# Patient Record
Sex: FEMALE | Race: WHITE | Employment: STUDENT | URBAN - NONMETROPOLITAN AREA
[De-identification: names, ages, dates, MRNs, and addresses within clinical notes are randomized per-mention and may not be internally consistent; named-entity substitution may affect disease eponyms.]

---

## 2020-01-30 ENCOUNTER — HOSPITAL ENCOUNTER (OUTPATIENT)
Dept: GENERAL RADIOLOGY | Age: 20
Discharge: HOME | End: 2020-01-30
Payer: COMMERCIAL

## 2020-01-30 ENCOUNTER — HOSPITAL ENCOUNTER (OUTPATIENT)
Age: 20
Discharge: HOME | End: 2020-01-30
Payer: COMMERCIAL

## 2020-01-30 ENCOUNTER — OFFICE VISIT (OUTPATIENT)
Dept: FAMILY MEDICINE CLINIC | Age: 20
End: 2020-01-30
Payer: COMMERCIAL

## 2020-01-30 VITALS
OXYGEN SATURATION: 97 % | BODY MASS INDEX: 21.71 KG/M2 | RESPIRATION RATE: 18 BRPM | DIASTOLIC BLOOD PRESSURE: 64 MMHG | HEIGHT: 62 IN | SYSTOLIC BLOOD PRESSURE: 100 MMHG | HEART RATE: 91 BPM | TEMPERATURE: 99.3 F | WEIGHT: 118 LBS

## 2020-01-30 DIAGNOSIS — R05.9 COUGH: ICD-10-CM

## 2020-01-30 DIAGNOSIS — R53.83 OTHER FATIGUE: ICD-10-CM

## 2020-01-30 PROBLEM — N94.3 PREMENSTRUAL SYNDROME: Status: ACTIVE | Noted: 2020-01-30

## 2020-01-30 PROBLEM — F41.9 ANXIETY: Status: ACTIVE | Noted: 2020-01-30

## 2020-01-30 PROBLEM — M54.6 ACUTE MIDLINE THORACIC BACK PAIN: Status: ACTIVE | Noted: 2020-01-30

## 2020-01-30 PROBLEM — J02.9 SORE THROAT: Status: ACTIVE | Noted: 2020-01-30

## 2020-01-30 LAB
BASOPHILS ABSOLUTE: 0.1 K/UL (ref 0–0.2)
BASOPHILS RELATIVE PERCENT: 0.7 %
EOSINOPHILS ABSOLUTE: 0.1 K/UL (ref 0–0.6)
EOSINOPHILS RELATIVE PERCENT: 0.8 %
HCT VFR BLD CALC: 36.2 % (ref 36–48)
HEMOGLOBIN: 11.6 G/DL (ref 12–16)
LYMPHOCYTES ABSOLUTE: 2 K/UL (ref 1–5.1)
LYMPHOCYTES RELATIVE PERCENT: 18.1 %
MCH RBC QN AUTO: 28 PG (ref 26–34)
MCHC RBC AUTO-ENTMCNC: 32.1 G/DL (ref 31–36)
MCV RBC AUTO: 87.1 FL (ref 80–100)
MONOCYTES ABSOLUTE: 1.2 K/UL (ref 0–1.3)
MONOCYTES RELATIVE PERCENT: 11 %
NEUTROPHILS ABSOLUTE: 7.8 K/UL (ref 1.7–7.7)
NEUTROPHILS RELATIVE PERCENT: 69.4 %
PDW BLD-RTO: 14.6 % (ref 12.4–15.4)
PLATELET # BLD: 381 K/UL (ref 135–450)
PMV BLD AUTO: 7.9 FL (ref 5–10.5)
RBC # BLD: 4.15 M/UL (ref 4–5.2)
STREPTOCOCCUS A RNA: NEGATIVE
WBC # BLD: 11.3 K/UL (ref 4–11)

## 2020-01-30 PROCEDURE — 71046 X-RAY EXAM CHEST 2 VIEWS: CPT

## 2020-01-30 PROCEDURE — G8484 FLU IMMUNIZE NO ADMIN: HCPCS | Performed by: PHYSICIAN ASSISTANT

## 2020-01-30 PROCEDURE — G8427 DOCREV CUR MEDS BY ELIG CLIN: HCPCS | Performed by: PHYSICIAN ASSISTANT

## 2020-01-30 PROCEDURE — 72072 X-RAY EXAM THORAC SPINE 3VWS: CPT

## 2020-01-30 PROCEDURE — 99203 OFFICE O/P NEW LOW 30 MIN: CPT | Performed by: PHYSICIAN ASSISTANT

## 2020-01-30 PROCEDURE — 1036F TOBACCO NON-USER: CPT | Performed by: PHYSICIAN ASSISTANT

## 2020-01-30 PROCEDURE — G8420 CALC BMI NORM PARAMETERS: HCPCS | Performed by: PHYSICIAN ASSISTANT

## 2020-01-30 PROCEDURE — 87651 STREP A DNA AMP PROBE: CPT | Performed by: PHYSICIAN ASSISTANT

## 2020-01-30 RX ORDER — ESCITALOPRAM OXALATE 20 MG/1
20 TABLET ORAL DAILY
Qty: 30 TABLET | Refills: 5 | Status: SHIPPED | OUTPATIENT
Start: 2020-01-30 | End: 2020-08-04

## 2020-01-30 RX ORDER — BUSPIRONE HYDROCHLORIDE 5 MG/1
5 TABLET ORAL 2 TIMES DAILY PRN
Qty: 60 TABLET | Refills: 5 | Status: SHIPPED | OUTPATIENT
Start: 2020-01-30 | End: 2020-08-06

## 2020-01-30 RX ORDER — BUSPIRONE HYDROCHLORIDE 5 MG/1
5 TABLET ORAL 2 TIMES DAILY PRN
COMMUNITY
End: 2020-01-30 | Stop reason: SDUPTHER

## 2020-01-30 RX ORDER — ONDANSETRON 4 MG/1
4 TABLET, ORALLY DISINTEGRATING ORAL PRN
COMMUNITY
Start: 2018-12-21 | End: 2020-01-30 | Stop reason: SDUPTHER

## 2020-01-30 RX ORDER — ESCITALOPRAM OXALATE 20 MG/1
20 TABLET ORAL DAILY
COMMUNITY
End: 2020-01-30 | Stop reason: SDUPTHER

## 2020-01-30 RX ORDER — BROMPHENIRAMINE MALEATE, PSEUDOEPHEDRINE HYDROCHLORIDE, AND DEXTROMETHORPHAN HYDROBROMIDE 2; 30; 10 MG/5ML; MG/5ML; MG/5ML
10 SYRUP ORAL
COMMUNITY
Start: 2020-01-28 | End: 2020-04-03 | Stop reason: ALTCHOICE

## 2020-01-30 RX ORDER — ONDANSETRON 4 MG/1
4 TABLET, ORALLY DISINTEGRATING ORAL EVERY 12 HOURS PRN
Qty: 60 TABLET | Refills: 3 | Status: SHIPPED | OUTPATIENT
Start: 2020-01-30 | End: 2020-12-21 | Stop reason: SDUPTHER

## 2020-01-30 RX ORDER — FAMOTIDINE 20 MG/1
20 TABLET, FILM COATED ORAL 2 TIMES DAILY
COMMUNITY
Start: 2020-01-07 | End: 2020-12-21 | Stop reason: SDUPTHER

## 2020-01-30 SDOH — HEALTH STABILITY: MENTAL HEALTH: HOW OFTEN DO YOU HAVE A DRINK CONTAINING ALCOHOL?: NEVER

## 2020-01-30 ASSESSMENT — PATIENT HEALTH QUESTIONNAIRE - PHQ9
1. LITTLE INTEREST OR PLEASURE IN DOING THINGS: 0
SUM OF ALL RESPONSES TO PHQ9 QUESTIONS 1 & 2: 0
SUM OF ALL RESPONSES TO PHQ QUESTIONS 1-9: 0
SUM OF ALL RESPONSES TO PHQ QUESTIONS 1-9: 0
2. FEELING DOWN, DEPRESSED OR HOPELESS: 0

## 2020-01-30 NOTE — PROGRESS NOTES
Olivia Valdes  2000  20 y.o.  female    SUBJECTIVE:    Chief Complaint   Patient presents with   [address] [facility] pt, here to establish care. She is requesting refills, of current meds. Former PCP Dr. Michael Fong in [address]  Congestion     Pt c/o sinus congestion/drainage, L ear fullness, sore throat, semi-productive cough, nausea (ongoing), x weeks. She was DX with URI, Bronchitis, and L ear infection. Rx'd AMOX, breathing TX, Tessalon, Steroid, and Cough syrup. Yet SX persist.     Other     Pt sees Naveed HAMILTON, at [facility].  Back Pain     Back pain from coughing in [place], also had previous XR, showing slipped at T12-S1. HPI   Back pain-constant, in high school she states she was told she has \"a slipped disc\"but pain now is different, feels like someone is stabbing her in her back, started approx two months ago. No injury but does work out everyday but has not done anything different. Has tried position changes/motrin/tylenol/lidocaine patch with no relief, pt states nothing makes it worse, states pain is just there all time. URI-onset 1/9/2020, has been treated with amoxil/tessalon capsules. Started to improve but then worsened before symptoms completely resolved, went to UC, diagnosed with viral infection but continues to have cough/congestion. Current Outpatient Medications on File Prior to Visit   Medication Sig Dispense Refill    famotidine (PEPCID) 20 MG tablet Take 20 mg by mouth 2 times daily      brompheniramine-pseudoephedrine-DM 2-30-10 MG/5ML syrup Take 10 mLs by mouth every 4-6 hours as needed       No current facility-administered medications on file prior to visit. Review of PMH, PSH, Family Hx, Allergies and updates made as needed. Premenstrual syndrome-x several years, previous PCP places pt on birth control and this has helped greatly with symptoms in addition to lexapro. Uses buspar as needed for anxiety.       PHQ Scores tightness. Negative for shortness of breath. Cardiovascular: Negative for chest pain. Gastrointestinal: Negative for abdominal pain, diarrhea, nausea and vomiting. Endocrine: Negative for cold intolerance, heat intolerance, polydipsia, polyphagia and polyuria. Genitourinary: Negative for dysuria, frequency and hematuria. Musculoskeletal: Positive for back pain. Negative for arthralgias and myalgias. Skin: Negative for color change and rash. Neurological: Negative for dizziness, weakness and headaches. Hematological: Negative for adenopathy. Does not bruise/bleed easily. Psychiatric/Behavioral: Negative for dysphoric mood. The patient is nervous/anxious. OBJECTIVE:    /64   Pulse 91   Temp 99.3 °F (37.4 °C) (Temporal)   Resp 18   Ht 5' 2\" (1.575 m)   Wt 118 lb (53.5 kg)   LMP 01/15/2020 (Exact Date)   SpO2 97%   Breastfeeding No   BMI 21.58 kg/m²     Physical Exam  Constitutional:       General: She is not in acute distress. Appearance: Normal appearance. She is well-developed. HENT:      Head: Normocephalic. Right Ear: Tympanic membrane, ear canal and external ear normal.      Left Ear: Tympanic membrane, ear canal and external ear normal.      Nose: Nose normal.      Mouth/Throat:      Mouth: Mucous membranes are moist.      Pharynx: Oropharynx is clear. Uvula midline. Eyes:      Extraocular Movements: Extraocular movements intact. Neck:      Musculoskeletal: Normal range of motion and neck supple. Thyroid: No thyromegaly. Trachea: Trachea normal.   Cardiovascular:      Rate and Rhythm: Normal rate and regular rhythm. Heart sounds: Normal heart sounds. Pulmonary:      Effort: Pulmonary effort is normal.      Breath sounds: Normal breath sounds. Abdominal:      General: Bowel sounds are normal.      Palpations: Abdomen is soft. Tenderness: There is no abdominal tenderness. Musculoskeletal: Normal range of motion.          General:

## 2020-01-30 NOTE — LETTER
[facility] & JATIN Molina [address] [place] [id number]  Phone: [phone]  Fax: [phone]    Deonte Green        January 30, 2020     Patient: Pricila Herrera   YOB: 2000   Date of Visit: 1/30/2020       To Whom it May Concern:    Olivia Petit was seen in my clinic on 1/30/2020. She may return to school on 2/2/2020. If you have any questions or concerns, please don't hesitate to call.     Sincerely,           Edison Rodriguez PA-C

## 2020-01-31 LAB
A/G RATIO: 1.4 (ref 1.1–2.2)
ALBUMIN SERPL-MCNC: 4 G/DL (ref 3.4–5)
ALP BLD-CCNC: 59 U/L (ref 40–129)
ALT SERPL-CCNC: 11 U/L (ref 10–40)
ANION GAP SERPL CALCULATED.3IONS-SCNC: 14 MMOL/L (ref 3–16)
AST SERPL-CCNC: 13 U/L (ref 15–37)
BILIRUB SERPL-MCNC: <0.2 MG/DL (ref 0–1)
BUN BLDV-MCNC: 17 MG/DL (ref 7–20)
CALCIUM SERPL-MCNC: 9.3 MG/DL (ref 8.3–10.6)
CHLORIDE BLD-SCNC: 98 MMOL/L (ref 99–110)
CO2: 25 MMOL/L (ref 21–32)
CREAT SERPL-MCNC: 0.7 MG/DL (ref 0.6–1.1)
GFR AFRICAN AMERICAN: >60
GFR NON-AFRICAN AMERICAN: >60
GLOBULIN: 2.9 G/DL
GLUCOSE BLD-MCNC: 85 MG/DL (ref 70–99)
POTASSIUM SERPL-SCNC: 4.7 MMOL/L (ref 3.5–5.1)
SODIUM BLD-SCNC: 137 MMOL/L (ref 136–145)
TOTAL PROTEIN: 6.9 G/DL (ref 6.4–8.2)

## 2020-02-01 LAB — MISCELLANEOUS LAB TEST ORDER: ABNORMAL

## 2020-02-02 RX ORDER — AZITHROMYCIN 250 MG/1
250 TABLET, FILM COATED ORAL SEE ADMIN INSTRUCTIONS
Qty: 6 TABLET | Refills: 0 | Status: SHIPPED | OUTPATIENT
Start: 2020-02-02 | End: 2020-02-02 | Stop reason: SDUPTHER

## 2020-02-02 RX ORDER — AZITHROMYCIN 250 MG/1
250 TABLET, FILM COATED ORAL SEE ADMIN INSTRUCTIONS
Qty: 6 TABLET | Refills: 0 | Status: SHIPPED | OUTPATIENT
Start: 2020-02-02 | End: 2020-02-07

## 2020-02-02 ASSESSMENT — ENCOUNTER SYMPTOMS
VOMITING: 0
COLOR CHANGE: 0
CHEST TIGHTNESS: 1
TROUBLE SWALLOWING: 0
COUGH: 1
DIARRHEA: 0
BACK PAIN: 1
NAUSEA: 0
SHORTNESS OF BREATH: 0
ABDOMINAL PAIN: 0

## 2020-02-29 PROBLEM — R05.9 COUGH: Status: RESOLVED | Noted: 2020-01-30 | Resolved: 2020-02-29

## 2020-02-29 PROBLEM — J02.9 SORE THROAT: Status: RESOLVED | Noted: 2020-01-30 | Resolved: 2020-02-29

## 2020-04-02 ENCOUNTER — VIRTUAL VISIT (OUTPATIENT)
Dept: FAMILY MEDICINE CLINIC | Age: 20
End: 2020-04-02
Payer: COMMERCIAL

## 2020-04-02 PROCEDURE — G8427 DOCREV CUR MEDS BY ELIG CLIN: HCPCS | Performed by: PHYSICIAN ASSISTANT

## 2020-04-02 PROCEDURE — 99213 OFFICE O/P EST LOW 20 MIN: CPT | Performed by: PHYSICIAN ASSISTANT

## 2020-04-02 RX ORDER — BUPROPION HYDROCHLORIDE 150 MG/1
150 TABLET ORAL EVERY MORNING
Qty: 30 TABLET | Refills: 3 | Status: SHIPPED | OUTPATIENT
Start: 2020-04-02 | End: 2020-08-04

## 2020-04-02 NOTE — PROGRESS NOTES
Angela Allison  2000  20 y.o.  female    SUBJECTIVE:    No chief complaint on file. Due to COVID-19 related state of emergency restrictions , as an alternative to an in-person session, the clinical decision was made to utilize a virtual visit to provide services for this patient's visit. These services were provided via video with the patient in their home while I was located at office. Identity was confirmed via patient name and . Verbal consent for use of telehealth was provided to and completed by the patient. HPI   Depression-pt has hx chronic depression and anxiety. Seeing counselor currently and pt states counselor feels she is having increasing issues with depression and recommends medication change. Pt is college student-sent home from campus approx 3 weeks ago, finishing semester online. Pt states she is having low energy most days, difficulty with concentration, poor motivation. Denies SI at this time. Current Outpatient Medications on File Prior to Visit   Medication Sig Dispense Refill    famotidine (PEPCID) 20 MG tablet Take 20 mg by mouth 2 times daily      busPIRone (BUSPAR) 5 MG tablet Take 1 tablet by mouth 2 times daily as needed (anxiety) 60 tablet 5    ondansetron (ZOFRAN-ODT) 4 MG disintegrating tablet Take 1 tablet by mouth every 12 hours as needed for Nausea 60 tablet 3    SPRINTEC 28 0.25-35 MG-MCG per tablet Take 1 tablet by mouth daily 1 packet 11    escitalopram (LEXAPRO) 20 MG tablet Take 1 tablet by mouth daily 30 tablet 5     No current facility-administered medications on file prior to visit. Review of PMH, PSH, Family Hx, Allergies and updates made as needed.     PHQ Scores 4/3/2020 2020   PHQ2 Score 2 0   PHQ9 Score 2 0     Interpretation of Total Score Depression Severity: 1-4 = Minimal depression, 5-9 = Mild depression, 10-14 = Moderate depression, 15-19 = Moderately severe depression, 20-27 = Severe depression      No Known Allergies    Past Medical

## 2020-04-03 PROBLEM — F34.1 DYSTHYMIA: Status: ACTIVE | Noted: 2020-04-03

## 2020-04-03 PROBLEM — M54.6 ACUTE MIDLINE THORACIC BACK PAIN: Status: RESOLVED | Noted: 2020-01-30 | Resolved: 2020-04-03

## 2020-04-03 ASSESSMENT — PATIENT HEALTH QUESTIONNAIRE - PHQ9
SUM OF ALL RESPONSES TO PHQ QUESTIONS 1-9: 2
SUM OF ALL RESPONSES TO PHQ9 QUESTIONS 1 & 2: 2
SUM OF ALL RESPONSES TO PHQ QUESTIONS 1-9: 2
2. FEELING DOWN, DEPRESSED OR HOPELESS: 1
1. LITTLE INTEREST OR PLEASURE IN DOING THINGS: 1

## 2020-04-03 ASSESSMENT — ENCOUNTER SYMPTOMS
ABDOMINAL PAIN: 0
COUGH: 0

## 2020-08-04 RX ORDER — BUPROPION HYDROCHLORIDE 150 MG/1
TABLET ORAL
Qty: 90 TABLET | Refills: 1 | Status: SHIPPED | OUTPATIENT
Start: 2020-08-04 | End: 2020-12-29

## 2020-08-04 RX ORDER — ESCITALOPRAM OXALATE 20 MG/1
TABLET ORAL
Qty: 30 TABLET | Refills: 5 | Status: SHIPPED | OUTPATIENT
Start: 2020-08-04 | End: 2020-12-21 | Stop reason: SDUPTHER

## 2020-08-06 RX ORDER — BUSPIRONE HYDROCHLORIDE 5 MG/1
5 TABLET ORAL 2 TIMES DAILY PRN
Qty: 60 TABLET | Refills: 5 | Status: SHIPPED | OUTPATIENT
Start: 2020-08-06 | End: 2020-12-21

## 2020-12-21 ENCOUNTER — OFFICE VISIT (OUTPATIENT)
Dept: FAMILY MEDICINE CLINIC | Age: 20
End: 2020-12-21
Payer: COMMERCIAL

## 2020-12-21 VITALS
SYSTOLIC BLOOD PRESSURE: 104 MMHG | DIASTOLIC BLOOD PRESSURE: 74 MMHG | RESPIRATION RATE: 16 BRPM | BODY MASS INDEX: 23.67 KG/M2 | WEIGHT: 129.4 LBS | OXYGEN SATURATION: 96 % | HEART RATE: 89 BPM | TEMPERATURE: 97.7 F

## 2020-12-21 DIAGNOSIS — R53.83 OTHER FATIGUE: ICD-10-CM

## 2020-12-21 LAB
A/G RATIO: 1.6 (ref 1.1–2.2)
ALBUMIN SERPL-MCNC: 4.5 G/DL (ref 3.4–5)
ALP BLD-CCNC: 66 U/L (ref 40–129)
ALT SERPL-CCNC: 12 U/L (ref 10–40)
ANION GAP SERPL CALCULATED.3IONS-SCNC: 11 MMOL/L (ref 3–16)
AST SERPL-CCNC: 17 U/L (ref 15–37)
BASOPHILS ABSOLUTE: 0.1 K/UL (ref 0–0.2)
BASOPHILS RELATIVE PERCENT: 0.9 %
BILIRUB SERPL-MCNC: <0.2 MG/DL (ref 0–1)
BUN BLDV-MCNC: 14 MG/DL (ref 7–20)
CALCIUM SERPL-MCNC: 9.6 MG/DL (ref 8.3–10.6)
CHLORIDE BLD-SCNC: 102 MMOL/L (ref 99–110)
CO2: 26 MMOL/L (ref 21–32)
CREAT SERPL-MCNC: 0.7 MG/DL (ref 0.6–1.1)
EOSINOPHILS ABSOLUTE: 0.1 K/UL (ref 0–0.6)
EOSINOPHILS RELATIVE PERCENT: 1.2 %
GFR AFRICAN AMERICAN: >60
GFR NON-AFRICAN AMERICAN: >60
GLOBULIN: 2.9 G/DL
GLUCOSE BLD-MCNC: 80 MG/DL (ref 70–99)
HCT VFR BLD CALC: 37.8 % (ref 36–48)
HEMOGLOBIN: 12.4 G/DL (ref 12–16)
LYMPHOCYTES ABSOLUTE: 1.7 K/UL (ref 1–5.1)
LYMPHOCYTES RELATIVE PERCENT: 27.8 %
MCH RBC QN AUTO: 28.1 PG (ref 26–34)
MCHC RBC AUTO-ENTMCNC: 32.8 G/DL (ref 31–36)
MCV RBC AUTO: 85.5 FL (ref 80–100)
MONOCYTES ABSOLUTE: 0.5 K/UL (ref 0–1.3)
MONOCYTES RELATIVE PERCENT: 7.5 %
NEUTROPHILS ABSOLUTE: 3.9 K/UL (ref 1.7–7.7)
NEUTROPHILS RELATIVE PERCENT: 62.6 %
PDW BLD-RTO: 15.2 % (ref 12.4–15.4)
PLATELET # BLD: 283 K/UL (ref 135–450)
PMV BLD AUTO: 8.6 FL (ref 5–10.5)
POTASSIUM SERPL-SCNC: 4.3 MMOL/L (ref 3.5–5.1)
RBC # BLD: 4.42 M/UL (ref 4–5.2)
SODIUM BLD-SCNC: 139 MMOL/L (ref 136–145)
TOTAL PROTEIN: 7.4 G/DL (ref 6.4–8.2)
TSH REFLEX FT4: 0.88 UIU/ML (ref 0.27–4.2)
WBC # BLD: 6.2 K/UL (ref 4–11)

## 2020-12-21 PROCEDURE — 1036F TOBACCO NON-USER: CPT | Performed by: PHYSICIAN ASSISTANT

## 2020-12-21 PROCEDURE — G8420 CALC BMI NORM PARAMETERS: HCPCS | Performed by: PHYSICIAN ASSISTANT

## 2020-12-21 PROCEDURE — 99214 OFFICE O/P EST MOD 30 MIN: CPT | Performed by: PHYSICIAN ASSISTANT

## 2020-12-21 PROCEDURE — G8484 FLU IMMUNIZE NO ADMIN: HCPCS | Performed by: PHYSICIAN ASSISTANT

## 2020-12-21 PROCEDURE — G8427 DOCREV CUR MEDS BY ELIG CLIN: HCPCS | Performed by: PHYSICIAN ASSISTANT

## 2020-12-21 RX ORDER — ESCITALOPRAM OXALATE 20 MG/1
TABLET ORAL
Qty: 30 TABLET | Refills: 5 | Status: SHIPPED | OUTPATIENT
Start: 2020-12-21 | End: 2020-12-29 | Stop reason: ALTCHOICE

## 2020-12-21 RX ORDER — HYDROXYZINE HYDROCHLORIDE 10 MG/1
10 TABLET, FILM COATED ORAL EVERY 8 HOURS PRN
Qty: 90 TABLET | Refills: 2 | Status: SHIPPED | OUTPATIENT
Start: 2020-12-21 | End: 2021-02-15 | Stop reason: SDUPTHER

## 2020-12-21 RX ORDER — FAMOTIDINE 20 MG/1
20 TABLET, FILM COATED ORAL 2 TIMES DAILY
Qty: 60 TABLET | Refills: 11 | Status: SHIPPED | OUTPATIENT
Start: 2020-12-21 | End: 2021-08-19

## 2020-12-21 RX ORDER — ONDANSETRON 4 MG/1
4 TABLET, ORALLY DISINTEGRATING ORAL EVERY 12 HOURS PRN
Qty: 60 TABLET | Refills: 3 | Status: SHIPPED | OUTPATIENT
Start: 2020-12-21 | End: 2021-03-17 | Stop reason: SDUPTHER

## 2020-12-21 ASSESSMENT — ENCOUNTER SYMPTOMS
COUGH: 0
ABDOMINAL PAIN: 0
EYES NEGATIVE: 1

## 2020-12-21 ASSESSMENT — PATIENT HEALTH QUESTIONNAIRE - PHQ9
2. FEELING DOWN, DEPRESSED OR HOPELESS: 1
1. LITTLE INTEREST OR PLEASURE IN DOING THINGS: 1
SUM OF ALL RESPONSES TO PHQ QUESTIONS 1-9: 2
SUM OF ALL RESPONSES TO PHQ9 QUESTIONS 1 & 2: 2

## 2020-12-21 NOTE — PROGRESS NOTES
Megan Donny  2000  23 y.o.  female    SUBJECTIVE:    Chief Complaint   Patient presents with   [address]     patient would like to discuss medication for depression     Other     would like to have a normal check up       HPI   Depression-pt feels depression is \"ok\". Has poor motivation/fatigue/lack of interest and started wellbutrin several months ago. Pt did not see any improvement in symptoms after 4-6 weeks so stopped the medication. Pt was also started on buspar bid for anxiety but has been using it prn only and is not noticing much improvement of symptoms on days anxiety is \"bad\". Fatigue-chronic, pt states she is tired all the time. Current Outpatient Medications on File Prior to Visit   Medication Sig Dispense Refill    buPROPion (WELLBUTRIN XL) 150 MG extended release tablet TAKE 1 TABLET BY MOUTH EVERY DAY IN THE MORNING (Patient not taking: Reported on 12/21/2020) 90 tablet 1     No current facility-administered medications on file prior to visit. Review of PMH, PSH, Family Hx, Allergies and updates made as needed. PHQ Scores 12/21/2020 4/3/2020 1/30/2020   PHQ2 Score 2 2 0   PHQ9 Score 2 2 0     Interpretation of Total Score Depression Severity: 1-4 = Minimal depression, 5-9 = Mild depression, 10-14 = Moderate depression, 15-19 = Moderately severe depression, 20-27 = Severe depression      No Known Allergies    Past Medical History:   Diagnosis Date    Anxiety     Heart murmur     As an infant       History reviewed. No pertinent surgical history.     Social History     Socioeconomic History    Marital status: Single     Spouse name: None    Number of children: None    Years of education: None    Highest education level: None   Occupational History    None   Social Needs    Financial resource strain: None    Food insecurity     Worry: None     Inability: None    Transportation needs     Medical: None     Non-medical: None   Tobacco Use    Smoking status: Never Smoker    Smokeless tobacco: Never Used   Substance and Sexual Activity    Alcohol use: Never     Frequency: Never    Drug use: Never    Sexual activity: None   Lifestyle    Physical activity     Days per week: None     Minutes per session: None    Stress: None   Relationships    Social connections     Talks on phone: None     Gets together: None     Attends [employer] service: None     Active member of club or organization: None     Attends meetings of clubs or organizations: None     Relationship status: None    Intimate partner violence     Fear of current or ex partner: None     Emotionally abused: None     Physically abused: None     Forced sexual activity: None   Other Topics Concern    None   Social History Narrative    None       Review of Systems   Constitutional: Positive for fatigue. Negative for chills and fever. HENT: Negative. Eyes: Negative. Respiratory: Negative for cough. Cardiovascular: Negative for chest pain. Gastrointestinal: Negative for abdominal pain. Endocrine: Negative for cold intolerance and heat intolerance. Genitourinary: Positive for menstrual problem. Musculoskeletal: Negative. Skin: Negative. Neurological: Negative. Psychiatric/Behavioral: Positive for dysphoric mood. Negative for self-injury, sleep disturbance and suicidal ideas. The patient is nervous/anxious. OBJECTIVE:    /74 (Site: Right Upper Arm, Position: Sitting, Cuff Size: Medium Adult)   Pulse 89   Temp 97.7 °F (36.5 °C) (Temporal)   Resp 16   Wt 129 lb 6.4 oz (58.7 kg)   LMP 12/16/2020   SpO2 96%   BMI 23.67 kg/m²     Physical Exam  Vitals signs reviewed. Constitutional:       Appearance: Normal appearance. HENT:      Head: Normocephalic. Right Ear: External ear normal.      Left Ear: External ear normal.   Eyes:      Extraocular Movements: Extraocular movements intact. Neck:      Musculoskeletal: Normal range of motion and neck supple.  No muscular tenderness. Cardiovascular:      Rate and Rhythm: Normal rate and regular rhythm. Heart sounds: Normal heart sounds. Pulmonary:      Effort: Pulmonary effort is normal.      Breath sounds: Normal breath sounds. Musculoskeletal: Normal range of motion. Lymphadenopathy:      Cervical: No cervical adenopathy. Skin:     General: Skin is warm and dry. Neurological:      Mental Status: She is alert and oriented to person, place, and time. Psychiatric:         Mood and Affect: Mood normal.         Thought Content: Thought content normal.         Judgment: Judgment normal.         ASSESSMENT/PLAN:    Problem List        Other    Premenstrual syndrome    Relevant Medications    escitalopram (LEXAPRO) 20 MG tablet    SPRINTEC 28 0.25-35 MG-MCG per tablet    Other fatigue    Relevant Orders    CBC WITH AUTO DIFFERENTIAL    TSH WITH REFLEX TO FT4    Comprehensive Metabolic Panel    Dysthymia    Relevant Medications    buPROPion (WELLBUTRIN XL) 150 MG extended release tablet    escitalopram (LEXAPRO) 20 MG tablet    hydrOXYzine (ATARAX) 10 MG tablet    Anxiety - Primary    Relevant Medications    buPROPion (WELLBUTRIN XL) 150 MG extended release tablet    escitalopram (LEXAPRO) 20 MG tablet    hydrOXYzine (ATARAX) 10 MG tablet               Return if symptoms worsen or fail to improve.

## 2020-12-29 ENCOUNTER — VIRTUAL VISIT (OUTPATIENT)
Dept: FAMILY MEDICINE CLINIC | Age: 20
End: 2020-12-29
Payer: COMMERCIAL

## 2020-12-29 DIAGNOSIS — Z13.31 POSITIVE DEPRESSION SCREENING: ICD-10-CM

## 2020-12-29 DIAGNOSIS — F41.1 GAD (GENERALIZED ANXIETY DISORDER): ICD-10-CM

## 2020-12-29 DIAGNOSIS — F33.1 MODERATE EPISODE OF RECURRENT MAJOR DEPRESSIVE DISORDER (HCC): Primary | ICD-10-CM

## 2020-12-29 PROCEDURE — G8484 FLU IMMUNIZE NO ADMIN: HCPCS | Performed by: NURSE PRACTITIONER

## 2020-12-29 PROCEDURE — G8428 CUR MEDS NOT DOCUMENT: HCPCS | Performed by: NURSE PRACTITIONER

## 2020-12-29 PROCEDURE — G8420 CALC BMI NORM PARAMETERS: HCPCS | Performed by: NURSE PRACTITIONER

## 2020-12-29 PROCEDURE — 1036F TOBACCO NON-USER: CPT | Performed by: NURSE PRACTITIONER

## 2020-12-29 PROCEDURE — 99214 OFFICE O/P EST MOD 30 MIN: CPT | Performed by: NURSE PRACTITIONER

## 2020-12-29 PROCEDURE — G8431 POS CLIN DEPRES SCRN F/U DOC: HCPCS | Performed by: NURSE PRACTITIONER

## 2020-12-29 RX ORDER — BUSPIRONE HYDROCHLORIDE 15 MG/1
TABLET ORAL
Qty: 49 TABLET | Refills: 0 | Status: SHIPPED | OUTPATIENT
Start: 2020-12-29 | End: 2021-01-11 | Stop reason: DRUGHIGH

## 2020-12-29 RX ORDER — AMITRIPTYLINE HYDROCHLORIDE 25 MG/1
25 TABLET, FILM COATED ORAL NIGHTLY
Qty: 30 TABLET | Refills: 0 | Status: SHIPPED | OUTPATIENT
Start: 2020-12-29 | End: 2021-01-26 | Stop reason: ALTCHOICE

## 2020-12-29 ASSESSMENT — ANXIETY QUESTIONNAIRES
GAD7 TOTAL SCORE: 11
3. WORRYING TOO MUCH ABOUT DIFFERENT THINGS: 2-OVER HALF THE DAYS
6. BECOMING EASILY ANNOYED OR IRRITABLE: 2-OVER HALF THE DAYS
5. BEING SO RESTLESS THAT IT IS HARD TO SIT STILL: 0-NOT AT ALL

## 2020-12-29 ASSESSMENT — PATIENT HEALTH QUESTIONNAIRE - PHQ9
6. FEELING BAD ABOUT YOURSELF - OR THAT YOU ARE A FAILURE OR HAVE LET YOURSELF OR YOUR FAMILY DOWN: 1
5. POOR APPETITE OR OVEREATING: 1
2. FEELING DOWN, DEPRESSED OR HOPELESS: 2
SUM OF ALL RESPONSES TO PHQ QUESTIONS 1-9: 13

## 2020-12-29 NOTE — PROGRESS NOTES
2020    TELEHEALTH EVALUATION -- Audio/Visual (During XCYKP-76 public health emergency)    HPI:    Carroll Griffin (:  2000) has requested an audio/video evaluation for the following concern(s): Anxiety/Depression  Patient with long standing hx of anxiety and depression. She reports that symptoms of depression started in her early teens. She has been experiencing increased anxiety since 2019 and symptoms worsened since the start of COVID. She has been seeing a therapist , Will Kowalski in [facility] weekly for the last 3 years. She currently endorses the following symptoms of anxiety:  difficulty concentrating, fatigue, feelings of losing control, irritable, racing thoughts, and excessive worry. She also reports anhedonia, depressed mood, difficulty concentrating, fatigue, feelings of worthlessness/guilt, impaired memory and insomnia. She reports difficulty falling asleep and staying asleep. On average it takes her 1-2 hours to fall asleep after laying down. She is taking benadryl twice a week to help her fall asleep. She will also occassionally take 10 mg of Melatonin which is helpful. She denies AVH/SIB/SI/HI. Historically she has been prescribed the following medications: Wellbutrin - not effective (April-2020)   Buspar - 5mg daily and prn  Lexapro - (2019 - present). She reports that it works well for her anxiety but has done nothing for her depression. Amitriptyline - for stomach pain. Medication was discontinue in  2019 because prescribing provider stated that increased dose wouldn't effectively treat her stomach pain. She notes that when taking Elavil her depression was well controlled and she did not have any difficulties with sleep. Review of Systems   Constitutional: Positive for fatigue. Respiratory: Negative for shortness of breath and wheezing. Cardiovascular: Negative for chest pain and palpitations.    Neurological: Negative for dizziness, tremors, weakness and headaches. Psychiatric/Behavioral: Positive for decreased concentration, dysphoric mood and sleep disturbance. Negative for agitation, self-injury and suicidal ideas. The patient is nervous/anxious. Prior to Visit Medications    Medication Sig Taking? Authorizing Provider   amitriptyline (ELAVIL) 25 MG tablet Take 1 tablet by mouth nightly Yes AZ Solorio CNP   busPIRone (BUSPAR) 15 MG tablet Take 1/2 tablet twice a day x7 days then increase to 1 tablet twice a day Yes AZ Esteves CNP   SPRINTEC 28 0.25-35 MG-MCG per tablet Take 1 tablet by mouth daily Yes Delphine Mon PA-C   ondansetron (ZOFRAN-ODT) 4 MG disintegrating tablet Take 1 tablet by mouth every 12 hours as needed for Nausea Yes Delphine Mon PA-C   hydrOXYzine (ATARAX) 10 MG tablet Take 1 tablet by mouth every 8 hours as needed for Anxiety Yes Delphine Mon PA-C   famotidine (PEPCID) 20 MG tablet Take 1 tablet by mouth 2 times daily As needed  Delphine Mon PA-C       Social History     Tobacco Use    Smoking status: Never Smoker    Smokeless tobacco: Never Used   Substance Use Topics    Alcohol use: Never     Frequency: Never    Drug use: Never        No Known Allergies,   Past Medical History:   Diagnosis Date    Anxiety     Heart murmur     As an infant       PHYSICAL EXAMINATION:  [ INSTRUCTIONS:  \"[x]\" Indicates a positive item  \"[]\" Indicates a negative item  -- DELETE ALL ITEMS NOT EXAMINED]    Constitutional: [x] Appears well-developed and well-nourished [x] No apparent distress      [] Abnormal-   Mental status  [x] Alert and awake  [x] Oriented to person/place/time [x]Able to follow commands      Eyes:  EOM    [x]  Normal  [] Abnormal-  Sclera  [x]  Normal  [] Abnormal -         Discharge [x]  None visible  [] Abnormal -    HENT:   [x] Normocephalic, atraumatic.   [] Abnormal   [x] Mouth/Throat: Mucous membranes are moist.     External Ears [x] Normal  [] Abnormal-     Neck: [x] No visualized mass     Pulmonary/Chest: [x] Respiratory effort normal.  [x] No visualized signs of difficulty breathing or respiratory distress        [] Abnormal-      Musculoskeletal:   [] Normal gait with no signs of ataxia         [x] Normal range of motion of neck        [] Abnormal-           Skin:        [x] No significant exanthematous lesions or discoloration noted on facial skin         [] Abnormal-            Psychiatric:       [] Normal Affect [x] No Hallucinations        [x] Abnormal- patient is tearful and anxious. ASSESSMENT/PLAN:  1. Moderate episode of recurrent major depressive disorder ([facility] [address]  Will have patient stop Lexapro. Start Elavil to address depression and sleep; will titrate to therapeutic dose. Patient encouraged to follow up with therapist as scheduled. Will follow up in 2 weeks. - amitriptyline (ELAVIL) 25 MG tablet; Take 1 tablet by mouth nightly  Dispense: 30 tablet; Refill: 0    2. Positive depression screening  - Positive Screen for Clinical Depression with a Documented Follow-up Plan     3. ELOISA (generalized anxiety disorder)  Will start Buspar for anxiety and titrate to therapeutic dose. Continue to use hydroxyzine as needed for anxiety. - busPIRone (BUSPAR) 15 MG tablet; Take 1/2 tablet twice a day x7 days then increase to 1 tablet twice a day  Dispense: 49 tablet; Refill: 0      Return in about 2 weeks (around 1/12/2021) for ELOISA, Insomnia, MDD. Phil Chicas is a 23 y.o. female being evaluated by a Virtual Visit (video visit) encounter to address concerns as mentioned above. A caregiver was present when appropriate. Due to this being a TeleHealth encounter (During [place] [address] public health emergency), evaluation of the following organ systems was limited: Vitals/Constitutional/EENT/Resp/CV/GI//MS/Neuro/Skin/Heme-Lymph-Imm.   Pursuant to the emergency declaration under the [facility] waiver authority and the Coronavirus Preparedness and Response Supplemental Appropriations Act, this Virtual Visit was conducted with patient's (and/or legal guardian's) consent, to reduce the patient's risk of exposure to COVID-19 and provide necessary medical care. The patient (and/or legal guardian) has also been advised to contact this office for worsening conditions or problems, and seek emergency medical treatment and/or call 911 if deemed necessary. Patient identification was verified at the start of the visit: Yes    Total time spent on this encounter: 30 minutes    Services were provided through a video synchronous discussion virtually to substitute for in-person clinic visit. Patient was located at their individual home and provider located in the medical office. THIS VISIT WAS COMPLETED VIRTUALLY USING [employer]. ME    --AZ Carmona CNP on 1/4/2021 at 11:08 AM    An electronic signature was used to authenticate this note.

## 2021-01-04 ASSESSMENT — ENCOUNTER SYMPTOMS
WHEEZING: 0
SHORTNESS OF BREATH: 0

## 2021-01-11 ENCOUNTER — VIRTUAL VISIT (OUTPATIENT)
Dept: FAMILY MEDICINE CLINIC | Age: 21
End: 2021-01-11
Payer: COMMERCIAL

## 2021-01-11 DIAGNOSIS — F41.1 GAD (GENERALIZED ANXIETY DISORDER): Primary | ICD-10-CM

## 2021-01-11 DIAGNOSIS — F34.1 DYSTHYMIA: ICD-10-CM

## 2021-01-11 PROCEDURE — G8427 DOCREV CUR MEDS BY ELIG CLIN: HCPCS | Performed by: NURSE PRACTITIONER

## 2021-01-11 PROCEDURE — 99213 OFFICE O/P EST LOW 20 MIN: CPT | Performed by: NURSE PRACTITIONER

## 2021-01-11 RX ORDER — BUSPIRONE HYDROCHLORIDE 15 MG/1
15 TABLET ORAL 3 TIMES DAILY
Qty: 90 TABLET | Refills: 0 | Status: SHIPPED | OUTPATIENT
Start: 2021-01-11 | End: 2021-01-26 | Stop reason: SINTOL

## 2021-01-11 RX ORDER — MECLIZINE HYDROCHLORIDE 25 MG/1
25 TABLET ORAL 3 TIMES DAILY PRN
Qty: 15 TABLET | Refills: 0 | Status: SHIPPED | OUTPATIENT
Start: 2021-01-11 | End: 2021-01-21

## 2021-01-11 ASSESSMENT — ANXIETY QUESTIONNAIRES
4. TROUBLE RELAXING: 3-NEARLY EVERY DAY
6. BECOMING EASILY ANNOYED OR IRRITABLE: 3-NEARLY EVERY DAY
GAD7 TOTAL SCORE: 18
5. BEING SO RESTLESS THAT IT IS HARD TO SIT STILL: 1-SEVERAL DAYS
7. FEELING AFRAID AS IF SOMETHING AWFUL MIGHT HAPPEN: 2-OVER HALF THE DAYS
1. FEELING NERVOUS, ANXIOUS, OR ON EDGE: 3-NEARLY EVERY DAY

## 2021-01-11 ASSESSMENT — ENCOUNTER SYMPTOMS
SHORTNESS OF BREATH: 0
SORE THROAT: 0
SINUS PAIN: 0
RHINORRHEA: 0
WHEEZING: 0
SINUS PRESSURE: 0

## 2021-01-11 ASSESSMENT — PATIENT HEALTH QUESTIONNAIRE - PHQ9
3. TROUBLE FALLING OR STAYING ASLEEP: 1
2. FEELING DOWN, DEPRESSED OR HOPELESS: 2
9. THOUGHTS THAT YOU WOULD BE BETTER OFF DEAD, OR OF HURTING YOURSELF: 0
5. POOR APPETITE OR OVEREATING: 1
4. FEELING TIRED OR HAVING LITTLE ENERGY: 1
SUM OF ALL RESPONSES TO PHQ9 QUESTIONS 1 & 2: 4
SUM OF ALL RESPONSES TO PHQ QUESTIONS 1-9: 10
1. LITTLE INTEREST OR PLEASURE IN DOING THINGS: 2
7. TROUBLE CONCENTRATING ON THINGS, SUCH AS READING THE NEWSPAPER OR WATCHING TELEVISION: 0

## 2021-01-11 NOTE — PROGRESS NOTES
2021    TELEHEALTH EVALUATION -- Audio/Visual (During DYDKS-41 public health emergency)    HPI:    Leia Richardson (:  2000) has requested an audio/video evaluation for the following concern(s):    2 week follow up for ELOISA and MDD. She currently endorses the following symptoms of anxiety: fatigue, feelings of losing control, irritable, racing thoughts, and excessive worry. Anxiety was worse over the weekend because she moved back to school and there was complications with getting into her dorm. She is currently staying in a hotel. She also reports anhedonia, depressed mood, appetite fluctuations, feelings of worthlessness/guilt, impaired memory and insomnia. She is crying daily, usually upon waking. She is sleeping better. She has been falling asleep within 45 minutes of laying down. She denies AVH/SIB/SI/HI    She started taking Buspar 15 mg BID on Thursday and since that time she has noted intermittent dizziness. She denies dimming vision, visual floaters, speech change, confusion, unconsciousness, drowsiness, personality change, headaches or paresthesias. She denies nasal congestion, ear pain/pressure, tinnitus, sore throat or cough. Review of Systems   Constitutional: Positive for fatigue. HENT: Negative for congestion, ear discharge, ear pain, hearing loss, postnasal drip, rhinorrhea, sinus pressure, sinus pain, sneezing, sore throat and tinnitus. Respiratory: Negative for shortness of breath and wheezing. Cardiovascular: Negative for chest pain and palpitations. Neurological: Positive for dizziness. Negative for tremors, weakness and headaches. Psychiatric/Behavioral: Positive for dysphoric mood. Negative for agitation, decreased concentration, self-injury, sleep disturbance and suicidal ideas. The patient is nervous/anxious. Prior to Visit Medications    Medication Sig Taking?  Authorizing Provider   meclizine (ANTIVERT) 25 MG tablet Take 1 tablet by mouth 3 times daily as needed for Dizziness Yes AZ Carias CNP   busPIRone (BUSPAR) 15 MG tablet Take 15 mg by mouth 3 times daily Yes Tod RutledgeAZ CNP   amitriptyline (ELAVIL) 25 MG tablet Take 1 tablet by mouth nightly Yes AZ Carias CNP   SPRINTEC 28 0.25-35 MG-MCG per tablet Take 1 tablet by mouth daily Yes Nakul Haji PA-C   famotidine (PEPCID) 20 MG tablet Take 1 tablet by mouth 2 times daily As needed Yes Nakul Haji PA-C   ondansetron (ZOFRAN-ODT) 4 MG disintegrating tablet Take 1 tablet by mouth every 12 hours as needed for Nausea  Nakul Haji PA-C   hydrOXYzine (ATARAX) 10 MG tablet Take 1 tablet by mouth every 8 hours as needed for Anxiety  Nakul Haji PA-C       Social History     Tobacco Use    Smoking status: Never Smoker    Smokeless tobacco: Never Used   Substance Use Topics    Alcohol use: Never     Frequency: Never    Drug use: Never        No Known Allergies,   Past Medical History:   Diagnosis Date    Anxiety     Heart murmur     As an infant       PHYSICAL EXAMINATION:  [ INSTRUCTIONS:  \"[x]\" Indicates a positive item  \"[]\" Indicates a negative item  -- DELETE ALL ITEMS NOT EXAMINED]  Constitutional: [x] Appears well-developed and well-nourished [x] No apparent distress      [] Abnormal-   Mental status  [x] Alert and awake  [x] Oriented to person/place/time [x]Able to follow commands      Eyes:  EOM    [x]  Normal  [] Abnormal-  Sclera  [x]  Normal  [] Abnormal -         Discharge [x]  None visible  [] Abnormal -    HENT:   [x] Normocephalic, atraumatic.   [] Abnormal   [x] Mouth/Throat: Mucous membranes are moist.     External Ears [x] Normal  [] Abnormal-     Neck: [x] No visualized mass     Pulmonary/Chest: [x] Respiratory effort normal.  [x] No visualized signs of difficulty breathing or respiratory distress        [] Abnormal-      Musculoskeletal:   [] Normal gait with no signs of ataxia         [x] Normal range of motion of neck        [] Abnormal-           Skin:        [x] No significant exanthematous lesions or discoloration noted on facial skin         [] Abnormal-            Psychiatric:       [x] Normal Affect [x] No Hallucinations        [] Abnormal-       ASSESSMENT/PLAN:  1. ELOISA (generalized anxiety disorder)  Will increase buspar to 15 mg TID to treat symptoms of anxiety. Meclizine as needed to manage dizziness. Patient to call with new or worsening symptoms. - meclizine (ANTIVERT) 25 MG tablet; Take 1 tablet by mouth 3 times daily as needed for Dizziness  Dispense: 15 tablet; Refill: 0  - busPIRone (BUSPAR) 15 MG tablet; Take 15 mg by mouth 3 times daily  Dispense: 90 tablet; Refill: 0    2. Dysthymia  Continue amitriptyline 25 mg at HS for depression. Patient expressed hesitency during last appointment related to starting Zoloft. After further discussion today she had Zoloft and Xanax confused. If she continues to wake up crying will consider switching to Sertraline to target symptoms of depression and anxiety. Return in about 2 weeks (around 1/25/2021) for ELOISA, Insomnia, MDD. Mena Prakash is a 23 y.o. female being evaluated by a Virtual Visit (video visit) encounter to address concerns as mentioned above. A caregiver was present when appropriate. Due to this being a TeleHealth encounter (During [place] [id number] public health emergency), evaluation of the following organ systems was limited: Vitals/Constitutional/EENT/Resp/CV/GI//MS/Neuro/Skin/Heme-Lymph-Imm. Pursuant to the emergency declaration under the [address] [place] [address], [address] [place] [address] authority and the [employer] and [employer] Act, this Virtual Visit was conducted with patient's (and/or legal guardian's) consent, to reduce the patient's risk of exposure to COVID-19 and provide necessary medical care.   The patient (and/or legal guardian) has also been advised to contact this office for worsening conditions or problems, and seek emergency medical treatment and/or call 911 if deemed necessary. Patient identification was verified at the start of the visit: Yes    Total time spent on this encounter: 25 minutes    Services were provided through a video synchronous discussion virtually to substitute for in-person clinic visit. Patient was located at their individual home and provider located in the medical office. THIS VISIT WAS COMPLETED VIRTUALLY USING DOXY. ME.    --AZ Al CNP on 1/11/2021 at 10:34 AM    An electronic signature was used to authenticate this note.

## 2021-01-26 ENCOUNTER — VIRTUAL VISIT (OUTPATIENT)
Dept: FAMILY MEDICINE CLINIC | Age: 21
End: 2021-01-26
Payer: COMMERCIAL

## 2021-01-26 DIAGNOSIS — F33.1 MODERATE EPISODE OF RECURRENT MAJOR DEPRESSIVE DISORDER (HCC): ICD-10-CM

## 2021-01-26 DIAGNOSIS — F41.1 GAD (GENERALIZED ANXIETY DISORDER): Primary | ICD-10-CM

## 2021-01-26 PROCEDURE — 99213 OFFICE O/P EST LOW 20 MIN: CPT | Performed by: NURSE PRACTITIONER

## 2021-01-26 PROCEDURE — G8427 DOCREV CUR MEDS BY ELIG CLIN: HCPCS | Performed by: NURSE PRACTITIONER

## 2021-01-26 RX ORDER — SERTRALINE HYDROCHLORIDE 25 MG/1
TABLET, FILM COATED ORAL
Qty: 49 TABLET | Refills: 0 | Status: SHIPPED | OUTPATIENT
Start: 2021-01-26 | End: 2021-02-15 | Stop reason: DRUGHIGH

## 2021-01-26 ASSESSMENT — PATIENT HEALTH QUESTIONNAIRE - PHQ9
1. LITTLE INTEREST OR PLEASURE IN DOING THINGS: 1
9. THOUGHTS THAT YOU WOULD BE BETTER OFF DEAD, OR OF HURTING YOURSELF: 0
7. TROUBLE CONCENTRATING ON THINGS, SUCH AS READING THE NEWSPAPER OR WATCHING TELEVISION: 0
3. TROUBLE FALLING OR STAYING ASLEEP: 1
SUM OF ALL RESPONSES TO PHQ QUESTIONS 1-9: 8
SUM OF ALL RESPONSES TO PHQ QUESTIONS 1-9: 8
6. FEELING BAD ABOUT YOURSELF - OR THAT YOU ARE A FAILURE OR HAVE LET YOURSELF OR YOUR FAMILY DOWN: 1

## 2021-01-26 ASSESSMENT — ANXIETY QUESTIONNAIRES
7. FEELING AFRAID AS IF SOMETHING AWFUL MIGHT HAPPEN: 2-OVER HALF THE DAYS
GAD7 TOTAL SCORE: 16
6. BECOMING EASILY ANNOYED OR IRRITABLE: 3-NEARLY EVERY DAY

## 2021-01-26 ASSESSMENT — ENCOUNTER SYMPTOMS
WHEEZING: 0
RHINORRHEA: 0
SHORTNESS OF BREATH: 0
SINUS PRESSURE: 0
SINUS PAIN: 0
SORE THROAT: 0
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
## 2021-02-15 DIAGNOSIS — F33.1 MODERATE EPISODE OF RECURRENT MAJOR DEPRESSIVE DISORDER (HCC): ICD-10-CM

## 2021-02-15 DIAGNOSIS — F41.1 GAD (GENERALIZED ANXIETY DISORDER): Primary | ICD-10-CM

## 2021-02-15 RX ORDER — HYDROXYZINE HYDROCHLORIDE 10 MG/1
10 TABLET, FILM COATED ORAL EVERY 8 HOURS PRN
Qty: 90 TABLET | Refills: 2 | Status: SHIPPED | OUTPATIENT
Start: 2021-02-15 | End: 2021-02-18 | Stop reason: SDUPTHER

## 2021-02-17 ENCOUNTER — TELEPHONE (OUTPATIENT)
Dept: FAMILY MEDICINE CLINIC | Age: 21
End: 2021-02-17

## 2021-02-17 NOTE — TELEPHONE ENCOUNTER
Pt calling states Zoloft was increased to 75mg. Refills were called in for 50mg. She is needing Zoloft 25 mg.       Called to  [facility]

## 2021-02-18 ENCOUNTER — VIRTUAL VISIT (OUTPATIENT)
Dept: FAMILY MEDICINE CLINIC | Age: 21
End: 2021-02-18
Payer: COMMERCIAL

## 2021-02-18 DIAGNOSIS — F33.1 MODERATE EPISODE OF RECURRENT MAJOR DEPRESSIVE DISORDER (HCC): ICD-10-CM

## 2021-02-18 DIAGNOSIS — F41.1 GAD (GENERALIZED ANXIETY DISORDER): ICD-10-CM

## 2021-02-18 PROCEDURE — G8484 FLU IMMUNIZE NO ADMIN: HCPCS | Performed by: NURSE PRACTITIONER

## 2021-02-18 PROCEDURE — 1036F TOBACCO NON-USER: CPT | Performed by: NURSE PRACTITIONER

## 2021-02-18 PROCEDURE — G8427 DOCREV CUR MEDS BY ELIG CLIN: HCPCS | Performed by: NURSE PRACTITIONER

## 2021-02-18 PROCEDURE — G8420 CALC BMI NORM PARAMETERS: HCPCS | Performed by: NURSE PRACTITIONER

## 2021-02-18 PROCEDURE — 99214 OFFICE O/P EST MOD 30 MIN: CPT | Performed by: NURSE PRACTITIONER

## 2021-02-18 RX ORDER — SERTRALINE HYDROCHLORIDE 100 MG/1
100 TABLET, FILM COATED ORAL DAILY
Qty: 30 TABLET | Refills: 1 | Status: SHIPPED | OUTPATIENT
Start: 2021-02-18 | End: 2021-03-22 | Stop reason: SDUPTHER

## 2021-02-18 RX ORDER — HYDROXYZINE HYDROCHLORIDE 10 MG/1
10 TABLET, FILM COATED ORAL EVERY 8 HOURS PRN
Qty: 90 TABLET | Refills: 2 | Status: SHIPPED | OUTPATIENT
Start: 2021-02-18 | End: 2021-05-19

## 2021-02-18 ASSESSMENT — ANXIETY QUESTIONNAIRES
6. BECOMING EASILY ANNOYED OR IRRITABLE: 1-SEVERAL DAYS
7. FEELING AFRAID AS IF SOMETHING AWFUL MIGHT HAPPEN: 1-SEVERAL DAYS
4. TROUBLE RELAXING: 2-OVER HALF THE DAYS

## 2021-02-18 ASSESSMENT — ENCOUNTER SYMPTOMS
SINUS PRESSURE: 0
SORE THROAT: 0
RHINORRHEA: 0
SINUS PAIN: 0
SHORTNESS OF BREATH: 0
WHEEZING: 0

## 2021-02-18 NOTE — PROGRESS NOTES
2021    TELEHEALTH EVALUATION -- Audio/Visual (During KBNGZ-14 public health emergency)    HPI:    Eric Salamanca (:  2000) has requested an audio/video evaluation for the following concern(s):    3 week follow up for anxiety. She is currently taking Sertraline 75 mg daily for anxiety and depression. She also has hydroxyzine 10 mg as needed for anxiety which she reports that she takes it once a day. Her grandfather  unexpectedly last week and she is grieving. She continues to endorse the following symptoms of anxiety:  feelings of losing control and  excessive worry. She notes that she is no where near as irritable as she was during her last appointment. She reports that she is sleeping good. She is no longer crying randomly and for no reason. She continues to be overwhelmed and anxious when she is around large group of people or in a really loud situation. She denies AVH/SIB/SI/HI. She has no SE with current medications and reports that her dizziness has resolved since stopping Buspar. She continues to see her therapist weekly but the therapist is moving so she is looking for a new therapist.      Review of Systems   Constitutional: Negative for fatigue. HENT: Negative for congestion, ear discharge, ear pain, hearing loss, postnasal drip, rhinorrhea, sinus pressure, sinus pain, sneezing, sore throat and tinnitus. Respiratory: Negative for shortness of breath and wheezing. Cardiovascular: Negative for chest pain and palpitations. Skin: Negative for rash. Neurological: Negative for dizziness, tremors, weakness and headaches. Psychiatric/Behavioral: Positive for agitation (much improved), decreased concentration and dysphoric mood. Negative for self-injury, sleep disturbance and suicidal ideas. The patient is nervous/anxious. Prior to Visit Medications    Medication Sig Taking?  Authorizing Provider   hydrOXYzine (ATARAX) 10 MG tablet Take 1 tablet by mouth every 8 hours as needed for Anxiety Yes AZ Fields CNP   sertraline (ZOLOFT) 100 MG tablet Take 1 tablet by mouth daily Yes AZ Fields CNP   SPRINTEC 28 0.25-35 MG-MCG per tablet Take 1 tablet by mouth daily  April Sultana PA-C   ondansetron (ZOFRAN-ODT) 4 MG disintegrating tablet Take 1 tablet by mouth every 12 hours as needed for Nausea  April Sultana PA-C   famotidine (PEPCID) 20 MG tablet Take 1 tablet by mouth 2 times daily As needed  April Sultana PA-C       Social History     Tobacco Use    Smoking status: Never Smoker    Smokeless tobacco: Never Used   Substance Use Topics    Alcohol use: Never     Frequency: Never    Drug use: Never        No Known Allergies,   Past Medical History:   Diagnosis Date    Anxiety     Heart murmur     As an infant       PHYSICAL EXAMINATION:  [ INSTRUCTIONS:  \"[x]\" Indicates a positive item  \"[]\" Indicates a negative item  -- DELETE ALL ITEMS NOT EXAMINED]    Constitutional: [x] Appears well-developed and well-nourished [x] No apparent distress      [] Abnormal-   Mental status  [x] Alert and awake  [x] Oriented to person/place/time [x]Able to follow commands      Eyes:  EOM    [x]  Normal  [] Abnormal-  Sclera  [x]  Normal  [] Abnormal -         Discharge [x]  None visible  [] Abnormal -    HENT:   [x] Normocephalic, atraumatic.   [] Abnormal   [x] Mouth/Throat: Mucous membranes are moist.     External Ears [x] Normal  [] Abnormal-     Neck: [x] No visualized mass     Pulmonary/Chest: [x] Respiratory effort normal.  [x] No visualized signs of difficulty breathing or respiratory distress        [] Abnormal-      Musculoskeletal:   [] Normal gait with no signs of ataxia         [x] Normal range of motion of neck        [] Abnormal-       Neurological:        [] No Facial Asymmetry (Cranial nerve 7 motor function) (limited exam to video visit)          [x] No gaze palsy        [] Abnormal-         Skin:        [x] No significant exanthematous lesions or

## 2021-03-17 RX ORDER — ONDANSETRON 4 MG/1
4 TABLET, ORALLY DISINTEGRATING ORAL EVERY 12 HOURS PRN
Qty: 60 TABLET | Refills: 3 | Status: SHIPPED | OUTPATIENT
Start: 2021-03-17 | End: 2021-08-19 | Stop reason: SDUPTHER

## 2021-03-22 ENCOUNTER — VIRTUAL VISIT (OUTPATIENT)
Dept: FAMILY MEDICINE CLINIC | Age: 21
End: 2021-03-22
Payer: COMMERCIAL

## 2021-03-22 DIAGNOSIS — G47.9 SLEEP DISTURBANCE: ICD-10-CM

## 2021-03-22 DIAGNOSIS — F33.1 MODERATE EPISODE OF RECURRENT MAJOR DEPRESSIVE DISORDER (HCC): ICD-10-CM

## 2021-03-22 DIAGNOSIS — F41.1 GAD (GENERALIZED ANXIETY DISORDER): ICD-10-CM

## 2021-03-22 PROCEDURE — 1036F TOBACCO NON-USER: CPT | Performed by: NURSE PRACTITIONER

## 2021-03-22 PROCEDURE — G8420 CALC BMI NORM PARAMETERS: HCPCS | Performed by: NURSE PRACTITIONER

## 2021-03-22 PROCEDURE — 99214 OFFICE O/P EST MOD 30 MIN: CPT | Performed by: NURSE PRACTITIONER

## 2021-03-22 PROCEDURE — G8428 CUR MEDS NOT DOCUMENT: HCPCS | Performed by: NURSE PRACTITIONER

## 2021-03-22 PROCEDURE — G8484 FLU IMMUNIZE NO ADMIN: HCPCS | Performed by: NURSE PRACTITIONER

## 2021-03-22 RX ORDER — SERTRALINE HYDROCHLORIDE 100 MG/1
100 TABLET, FILM COATED ORAL DAILY
Qty: 30 TABLET | Refills: 0 | Status: SHIPPED | OUTPATIENT
Start: 2021-03-22 | End: 2021-04-12 | Stop reason: SDUPTHER

## 2021-03-22 ASSESSMENT — PATIENT HEALTH QUESTIONNAIRE - PHQ9
SUM OF ALL RESPONSES TO PHQ QUESTIONS 1-9: 16
8. MOVING OR SPEAKING SO SLOWLY THAT OTHER PEOPLE COULD HAVE NOTICED. OR THE OPPOSITE, BEING SO FIGETY OR RESTLESS THAT YOU HAVE BEEN MOVING AROUND A LOT MORE THAN USUAL: 0
SUM OF ALL RESPONSES TO PHQ9 QUESTIONS 1 & 2: 4
7. TROUBLE CONCENTRATING ON THINGS, SUCH AS READING THE NEWSPAPER OR WATCHING TELEVISION: 1

## 2021-03-22 ASSESSMENT — ENCOUNTER SYMPTOMS
SINUS PAIN: 0
SHORTNESS OF BREATH: 0
SINUS PRESSURE: 0
SORE THROAT: 0
WHEEZING: 0
RHINORRHEA: 0

## 2021-04-12 ENCOUNTER — VIRTUAL VISIT (OUTPATIENT)
Dept: FAMILY MEDICINE CLINIC | Age: 21
End: 2021-04-12
Payer: COMMERCIAL

## 2021-04-12 DIAGNOSIS — F41.1 GAD (GENERALIZED ANXIETY DISORDER): ICD-10-CM

## 2021-04-12 DIAGNOSIS — F33.1 MODERATE EPISODE OF RECURRENT MAJOR DEPRESSIVE DISORDER (HCC): Primary | ICD-10-CM

## 2021-04-12 PROCEDURE — G8427 DOCREV CUR MEDS BY ELIG CLIN: HCPCS | Performed by: NURSE PRACTITIONER

## 2021-04-12 PROCEDURE — 99213 OFFICE O/P EST LOW 20 MIN: CPT | Performed by: NURSE PRACTITIONER

## 2021-04-12 RX ORDER — SERTRALINE HYDROCHLORIDE 100 MG/1
100 TABLET, FILM COATED ORAL DAILY
Qty: 30 TABLET | Refills: 2 | Status: SHIPPED | OUTPATIENT
Start: 2021-04-12 | End: 2021-07-12

## 2021-04-12 ASSESSMENT — PATIENT HEALTH QUESTIONNAIRE - PHQ9
SUM OF ALL RESPONSES TO PHQ QUESTIONS 1-9: 8
SUM OF ALL RESPONSES TO PHQ9 QUESTIONS 1 & 2: 2
7. TROUBLE CONCENTRATING ON THINGS, SUCH AS READING THE NEWSPAPER OR WATCHING TELEVISION: 0
2. FEELING DOWN, DEPRESSED OR HOPELESS: 1
8. MOVING OR SPEAKING SO SLOWLY THAT OTHER PEOPLE COULD HAVE NOTICED. OR THE OPPOSITE, BEING SO FIGETY OR RESTLESS THAT YOU HAVE BEEN MOVING AROUND A LOT MORE THAN USUAL: 0
3. TROUBLE FALLING OR STAYING ASLEEP: 1
6. FEELING BAD ABOUT YOURSELF - OR THAT YOU ARE A FAILURE OR HAVE LET YOURSELF OR YOUR FAMILY DOWN: 1
9. THOUGHTS THAT YOU WOULD BE BETTER OFF DEAD, OR OF HURTING YOURSELF: 0

## 2021-04-12 ASSESSMENT — ENCOUNTER SYMPTOMS
WHEEZING: 0
RHINORRHEA: 0
SORE THROAT: 0
SHORTNESS OF BREATH: 0
SINUS PAIN: 0
SINUS PRESSURE: 0

## 2021-04-12 NOTE — PROGRESS NOTES
2021    TELEHEALTH EVALUATION -- Audio/Visual (During YTJIC-55 public health emergency)    HPI:    Melba Elise (:  2000) has requested an audio/video evaluation for the following concern(s):    1 month follow up for ELOISA and MDD. She is currently prescribed Sertraline 150 mg daily for anxiety and depression and hydroxyzine 10 mg TID prn for anxiety. She has taken the Hydroxyzine 1 time over the several weeks for anxiety. She is doing \"much better\". Improvement noted in PHQ-9 and ELOISA -10. She continues to endorse excessive worry, decreased energy and mildly depressed mood but has noted a decrease in overall symptoms of anxiety and depression. She graduates from college next weekend and she and her fiancee have decided to get  at the end of May so she is busy planning a wedding. She is still looking for a new therapist.  She denies AVH/SIB/SI/HI. Review of Systems   Constitutional: Negative for fatigue. HENT: Negative for congestion, ear discharge, ear pain, hearing loss, postnasal drip, rhinorrhea, sinus pressure, sinus pain, sneezing, sore throat and tinnitus. Respiratory: Negative for shortness of breath and wheezing. Cardiovascular: Negative for chest pain and palpitations. Skin: Negative for rash. Neurological: Negative for dizziness, tremors, weakness and headaches. Psychiatric/Behavioral: Positive for agitation (much improved), decreased concentration, dysphoric mood and sleep disturbance. Negative for self-injury and suicidal ideas. The patient is nervous/anxious. Prior to Visit Medications    Medication Sig Taking?  Authorizing Provider   sertraline (ZOLOFT) 100 MG tablet Take 1 tablet by mouth daily Yes AZ Murray CNP   sertraline (ZOLOFT) 50 MG tablet Take 1 tablet by mouth daily Yes AZ Murray - CNP   ondansetron (ZOFRAN-ODT) 4 MG disintegrating tablet Take 1 tablet by mouth every 12 hours as needed for Nausea Yes Cherelle Fontaine Hallucinations        [] Abnormal-       ASSESSMENT/PLAN:  1. ELOISA (generalized anxiety disorder)  Continue Sertraline 150 mg daily, hydroxyzine 10 mg TID prn for anxiety. Continue to look for new therapist.    - sertraline (ZOLOFT) 100 MG tablet; Take 1 tablet by mouth daily  Dispense: 30 tablet; Refill: 2  - sertraline (ZOLOFT) 50 MG tablet; Take 1 tablet by mouth daily  Dispense: 30 tablet; Refill: 2    2. Moderate episode of recurrent major depressive disorder (HCC)  Stable. Continue medication. Patient to call if any concerns or SI before her follow up appointment. If she develops suicidal thoughts with a plan, she is instructed to go to emergency room immediately. Behavioral counseling recommended. - sertraline (ZOLOFT) 100 MG tablet; Take 1 tablet by mouth daily  Dispense: 30 tablet; Refill: 2  - sertraline (ZOLOFT) 50 MG tablet; Take 1 tablet by mouth daily  Dispense: 30 tablet; Refill: 2      Return in about 8 weeks (around 6/7/2021). Leydi Fernandez, was evaluated through a synchronous (real-time) audio-video encounter. The patient (or guardian if applicable) is aware that this is a billable service. Verbal consent to proceed has been obtained within the past 12 months. The visit was conducted pursuant to the emergency declaration under the [address] [place] [address], [address] [place] [address] authority and the [employer] and [employer] Act. Patient identification was verified, and a caregiver was present when appropriate. The patient was located in a state where the provider was credentialed to provide care. THIS VISIT WAS COMPLETED VIRTUALLY USING DOXY. ME    Total time spent on this encounter: 20 minutes    --AZ Pederson - CNP on 4/12/2021 at 4:35 PM    An electronic signature was used to authenticate this note.

## 2021-07-10 DIAGNOSIS — F41.1 GAD (GENERALIZED ANXIETY DISORDER): ICD-10-CM

## 2021-07-10 DIAGNOSIS — F33.1 MODERATE EPISODE OF RECURRENT MAJOR DEPRESSIVE DISORDER (HCC): ICD-10-CM

## 2021-07-12 RX ORDER — SERTRALINE HYDROCHLORIDE 100 MG/1
TABLET, FILM COATED ORAL
Qty: 30 TABLET | Refills: 2 | Status: SHIPPED | OUTPATIENT
Start: 2021-07-12 | End: 2021-09-30 | Stop reason: SDUPTHER

## 2021-08-19 ENCOUNTER — VIRTUAL VISIT (OUTPATIENT)
Dept: FAMILY MEDICINE CLINIC | Age: 21
End: 2021-08-19
Payer: COMMERCIAL

## 2021-08-19 DIAGNOSIS — Z13.31 POSITIVE DEPRESSION SCREENING: ICD-10-CM

## 2021-08-19 DIAGNOSIS — R11.2 NAUSEA AND VOMITING, INTRACTABILITY OF VOMITING NOT SPECIFIED, UNSPECIFIED VOMITING TYPE: ICD-10-CM

## 2021-08-19 DIAGNOSIS — F41.1 GENERALIZED ANXIETY DISORDER: Primary | ICD-10-CM

## 2021-08-19 DIAGNOSIS — K21.9 GASTROESOPHAGEAL REFLUX DISEASE, UNSPECIFIED WHETHER ESOPHAGITIS PRESENT: ICD-10-CM

## 2021-08-19 DIAGNOSIS — F34.1 DYSTHYMIA: ICD-10-CM

## 2021-08-19 PROCEDURE — G8431 POS CLIN DEPRES SCRN F/U DOC: HCPCS | Performed by: NURSE PRACTITIONER

## 2021-08-19 PROCEDURE — G8427 DOCREV CUR MEDS BY ELIG CLIN: HCPCS | Performed by: NURSE PRACTITIONER

## 2021-08-19 PROCEDURE — 99214 OFFICE O/P EST MOD 30 MIN: CPT | Performed by: NURSE PRACTITIONER

## 2021-08-19 RX ORDER — ONDANSETRON 4 MG/1
4 TABLET, ORALLY DISINTEGRATING ORAL EVERY 12 HOURS PRN
Qty: 60 TABLET | Refills: 3 | Status: SHIPPED | OUTPATIENT
Start: 2021-08-19 | End: 2022-02-22 | Stop reason: SDUPTHER

## 2021-08-19 RX ORDER — HYDROXYZINE HYDROCHLORIDE 25 MG/1
25 TABLET, FILM COATED ORAL 3 TIMES DAILY PRN
COMMUNITY
End: 2022-07-21 | Stop reason: SDUPTHER

## 2021-08-19 RX ORDER — SUCRALFATE ORAL 1 G/10ML
1 SUSPENSION ORAL 4 TIMES DAILY
Qty: 1200 ML | Refills: 1 | Status: SHIPPED | OUTPATIENT
Start: 2021-08-19

## 2021-08-19 RX ORDER — PANTOPRAZOLE SODIUM 20 MG/1
20 TABLET, DELAYED RELEASE ORAL
Qty: 30 TABLET | Refills: 0 | Status: SHIPPED | OUTPATIENT
Start: 2021-08-19 | End: 2021-09-13 | Stop reason: SDUPTHER

## 2021-08-19 ASSESSMENT — ANXIETY QUESTIONNAIRES
7. FEELING AFRAID AS IF SOMETHING AWFUL MIGHT HAPPEN: 3
1. FEELING NERVOUS, ANXIOUS, OR ON EDGE: 3
2. NOT BEING ABLE TO STOP OR CONTROL WORRYING: 3
3. WORRYING TOO MUCH ABOUT DIFFERENT THINGS: 3
GAD7 TOTAL SCORE: 17
6. BECOMING EASILY ANNOYED OR IRRITABLE: 1
5. BEING SO RESTLESS THAT IT IS HARD TO SIT STILL: 1
4. TROUBLE RELAXING: 3
IF YOU CHECKED OFF ANY PROBLEMS ON THIS QUESTIONNAIRE, HOW DIFFICULT HAVE THESE PROBLEMS MADE IT FOR YOU TO DO YOUR WORK, TAKE CARE OF THINGS AT HOME, OR GET ALONG WITH OTHER PEOPLE: VERY DIFFICULT

## 2021-08-19 ASSESSMENT — PATIENT HEALTH QUESTIONNAIRE - PHQ9
10. IF YOU CHECKED OFF ANY PROBLEMS, HOW DIFFICULT HAVE THESE PROBLEMS MADE IT FOR YOU TO DO YOUR WORK, TAKE CARE OF THINGS AT HOME, OR GET ALONG WITH OTHER PEOPLE: 2
9. THOUGHTS THAT YOU WOULD BE BETTER OFF DEAD, OR OF HURTING YOURSELF: 0
SUM OF ALL RESPONSES TO PHQ9 QUESTIONS 1 & 2: 6
SUM OF ALL RESPONSES TO PHQ QUESTIONS 1-9: 12
4. FEELING TIRED OR HAVING LITTLE ENERGY: 3
7. TROUBLE CONCENTRATING ON THINGS, SUCH AS READING THE NEWSPAPER OR WATCHING TELEVISION: 1
SUM OF ALL RESPONSES TO PHQ QUESTIONS 1-9: 12
6. FEELING BAD ABOUT YOURSELF - OR THAT YOU ARE A FAILURE OR HAVE LET YOURSELF OR YOUR FAMILY DOWN: 1
8. MOVING OR SPEAKING SO SLOWLY THAT OTHER PEOPLE COULD HAVE NOTICED. OR THE OPPOSITE, BEING SO FIGETY OR RESTLESS THAT YOU HAVE BEEN MOVING AROUND A LOT MORE THAN USUAL: 1
SUM OF ALL RESPONSES TO PHQ QUESTIONS 1-9: 12
1. LITTLE INTEREST OR PLEASURE IN DOING THINGS: 3
5. POOR APPETITE OR OVEREATING: 0
2. FEELING DOWN, DEPRESSED OR HOPELESS: 3
3. TROUBLE FALLING OR STAYING ASLEEP: 0

## 2021-08-19 NOTE — PROGRESS NOTES
2021    TELEHEALTH EVALUATION -- Audio/Visual (During HXEAQ-38 public health emergency)    HPI:    Tyler Tillman (:  2000) has requested an audio/video evaluation for the following concern(s): Anxiety/Depression  She is currently prescribed Sertraline 150 mg daily for anxiety and depression and hydroxyzine 10 mg TID prn for anxiety.  She does not feel that Sertraline is effective. She continues to endorse feelings of anxiety, excessive worry, decreased energy and mildly depressed mood. She is seeing a new therapist every other week. She recently started a new teaching job and thinks this could be contributing to feelings of increased anxiety. She denies auditory/visual hallucinations, self-injurious behaviors, suicidal or homicidal ideations. Historically she has been prescribed the following medications: Wellbutrin - not effective (April-2020)   Buspar - dizziness  Lexapro - (2019 - present). She reports that it works well for her anxiety but has done nothing for her depression. Amitriptyline - for stomach pain. Medication was discontinue in  2019 because prescribing provider stated that increased dose wouldn't effectively treat her stomach pain. She notes that when taking Elavil her depression was well controlled and she did not have any difficulties with sleep. GERD  Since May she has noted burning when swallowing with medications and then will have heartburn all night. There are been several instances where she will vomit \"stomach acid. \"She states that it doesn't happen every night but she is fearful to eat in the evenings . She has had GI issues since she was 14. She followed with Gastroenterology and had normal EGD at 14 with no follow up since that time. She is living in [place] and would like a referral to a specialist close to that area. Review of Systems   Constitutional: Negative.     HENT: Negative for congestion, ear discharge, ear pain, hearing loss, postnasal drip, rhinorrhea, sinus pressure, sinus pain, sneezing, sore throat and tinnitus. Respiratory: Negative for shortness of breath and wheezing. Cardiovascular: Negative for chest pain and palpitations. Neurological: Negative for dizziness, tremors, weakness and headaches. Psychiatric/Behavioral: Positive for dysphoric mood. Negative for agitation, decreased concentration, self-injury, sleep disturbance and suicidal ideas. The patient is nervous/anxious. Prior to Visit Medications    Medication Sig Taking?  Authorizing Provider   hydrOXYzine (ATARAX) 25 MG tablet Take 25 mg by mouth 3 times daily as needed Yes Historical Provider, MD   ondansetron (ZOFRAN-ODT) 4 MG disintegrating tablet Take 1 tablet by mouth every 12 hours as needed for Nausea Yes AZ Parish CNP   pantoprazole (PROTONIX) 20 MG tablet Take 1 tablet by mouth every morning (before breakfast) Yes AZ Parish CNP   sucralfate (CARAFATE) 1 GM/10ML suspension Take 10 mLs by mouth 4 times daily Yes Gloria Bosworth, APRN - CNP   sertraline (ZOLOFT) 100 MG tablet TAKE 1 TABLET BY MOUTH EVERY DAY Yes Negro Storey   sertraline (ZOLOFT) 50 MG tablet TAKE 1 TABLET BY MOUTH EVERY DAY Yes Viri Timmons [place]   SPRINTEC 28 0.25-35 MG-MCG per tablet Take 1 tablet by mouth daily Yes Alfie Kingsley PA-C       Social History     Tobacco Use    Smoking status: Never Smoker    Smokeless tobacco: Never Used   Vaping Use    Vaping Use: Never used   Substance Use Topics    Alcohol use: Never    Drug use: Never        No Known Allergies,   Past Medical History:   Diagnosis Date    Anxiety     Heart murmur     As an infant       PHYSICAL EXAMINATION:  [ INSTRUCTIONS:  \"[x]\" Indicates a positive item  \"[]\" Indicates a negative item  -- DELETE ALL ITEMS NOT EXAMINED]    Pt unable to obtain VS    Constitutional: [x] Appears well-developed and well-nourished [x] No apparent distress      [] Abnormal-   Mental status  [x] Alert and awake  [x] Oriented to person/place/time [x]Able to follow commands      Eyes:  EOM    [x]  Normal  [] Abnormal-  Sclera  [x]  Normal  [] Abnormal -         Discharge [x]  None visible  [] Abnormal -    HENT:   [x] Normocephalic, atraumatic. [] Abnormal   [x] Mouth/Throat: Mucous membranes are moist.     External Ears [x] Normal  [] Abnormal-     Neck: [x] No visualized mass     Pulmonary/Chest: [x] Respiratory effort normal.  [x] No visualized signs of difficulty breathing or respiratory distress        [] Abnormal-      Musculoskeletal:   [] Normal gait with no signs of ataxia         [x] Normal range of motion of neck        [] Abnormal-       Neurological:        [] No Facial Asymmetry (Cranial nerve 7 motor function) (limited exam to video visit)          [x] No gaze palsy        [] Abnormal-         Skin:        [x] No significant exanthematous lesions or discoloration noted on facial skin         [] Abnormal-            Psychiatric:       [x] Normal Affect [x] No Hallucinations        [] Abnormal-       ASSESSMENT/PLAN:  1. Generalized anxiety disorder  Continue Sertraline 150 mg daily for anxiety and depression. Discussed weaning off Sertraline and starting Effexor for symptoms. Pt is hesitant to start weaning medication at this time. Will consider starting propranolol to help treat anxiety. Pt agreeable to purchase BP monitor and send current BP and HR via [employer] before medication is prescribed. 2. Dysthymia  Continue Sertraline 150 mg daily for anxiety and depression. Discussed weaning off Sertraline and starting Effexor for symptoms. Pt is hesitant to start weaning medication at this time. Patient to call if any concerns or SI before her follow up appointment. If she develops suicidal thoughts with a plan, she is instructed to go to emergency room immediately. Behavioral counseling recommended.       3. Positive depression screening  - Positive in a state where the provider was credentialed to provide care. Total time spent on this encounter: 30 minutes    --AZ Goss CNP on 8/27/2021 at 2:33 PM    An electronic signature was used to authenticate this note.

## 2021-08-27 ASSESSMENT — ENCOUNTER SYMPTOMS
SINUS PAIN: 0
WHEEZING: 0
SORE THROAT: 0
SHORTNESS OF BREATH: 0
RHINORRHEA: 0
SINUS PRESSURE: 0

## 2021-09-09 ENCOUNTER — VIRTUAL VISIT (OUTPATIENT)
Dept: FAMILY MEDICINE CLINIC | Age: 21
End: 2021-09-09
Payer: COMMERCIAL

## 2021-09-09 DIAGNOSIS — F41.1 GENERALIZED ANXIETY DISORDER: ICD-10-CM

## 2021-09-09 DIAGNOSIS — F33.1 MODERATE EPISODE OF RECURRENT MAJOR DEPRESSIVE DISORDER (HCC): Primary | ICD-10-CM

## 2021-09-09 PROCEDURE — 99213 OFFICE O/P EST LOW 20 MIN: CPT | Performed by: NURSE PRACTITIONER

## 2021-09-09 RX ORDER — BUPROPION HYDROCHLORIDE 150 MG/1
150 TABLET ORAL EVERY MORNING
Qty: 30 TABLET | Refills: 0 | Status: SHIPPED | OUTPATIENT
Start: 2021-09-09 | End: 2021-09-30 | Stop reason: DRUGHIGH

## 2021-09-09 ASSESSMENT — PATIENT HEALTH QUESTIONNAIRE - PHQ9
5. POOR APPETITE OR OVEREATING: 0
6. FEELING BAD ABOUT YOURSELF - OR THAT YOU ARE A FAILURE OR HAVE LET YOURSELF OR YOUR FAMILY DOWN: 3
SUM OF ALL RESPONSES TO PHQ QUESTIONS 1-9: 12
2. FEELING DOWN, DEPRESSED OR HOPELESS: 1
9. THOUGHTS THAT YOU WOULD BE BETTER OFF DEAD, OR OF HURTING YOURSELF: 0
8. MOVING OR SPEAKING SO SLOWLY THAT OTHER PEOPLE COULD HAVE NOTICED. OR THE OPPOSITE, BEING SO FIGETY OR RESTLESS THAT YOU HAVE BEEN MOVING AROUND A LOT MORE THAN USUAL: 1
7. TROUBLE CONCENTRATING ON THINGS, SUCH AS READING THE NEWSPAPER OR WATCHING TELEVISION: 3
4. FEELING TIRED OR HAVING LITTLE ENERGY: 3
SUM OF ALL RESPONSES TO PHQ9 QUESTIONS 1 & 2: 2
SUM OF ALL RESPONSES TO PHQ QUESTIONS 1-9: 12
SUM OF ALL RESPONSES TO PHQ QUESTIONS 1-9: 12
3. TROUBLE FALLING OR STAYING ASLEEP: 0
1. LITTLE INTEREST OR PLEASURE IN DOING THINGS: 1

## 2021-09-09 ASSESSMENT — ANXIETY QUESTIONNAIRES
GAD7 TOTAL SCORE: 13
7. FEELING AFRAID AS IF SOMETHING AWFUL MIGHT HAPPEN: 1
6. BECOMING EASILY ANNOYED OR IRRITABLE: 3
4. TROUBLE RELAXING: 1
1. FEELING NERVOUS, ANXIOUS, OR ON EDGE: 3
2. NOT BEING ABLE TO STOP OR CONTROL WORRYING: 2
5. BEING SO RESTLESS THAT IT IS HARD TO SIT STILL: 0
3. WORRYING TOO MUCH ABOUT DIFFERENT THINGS: 3

## 2021-09-09 NOTE — PROGRESS NOTES
self-injury, sleep disturbance and suicidal ideas. The patient is nervous/anxious. Prior to Visit Medications    Medication Sig Taking?  Authorizing Provider   buPROPion (WELLBUTRIN XL) 150 MG extended release tablet Take 1 tablet by mouth every morning Yes AZ Waller CNP   ondansetron (ZOFRAN-ODT) 4 MG disintegrating tablet Take 1 tablet by mouth every 12 hours as needed for Nausea Yes AZ Waller CNP   sucralfate (CARAFATE) 1 GM/10ML suspension Take 10 mLs by mouth 4 times daily Yes AZ Bruner CNP   sertraline (ZOLOFT) 100 MG tablet TAKE 1 TABLET BY MOUTH EVERY DAY Yes Negro Careron   sertraline (ZOLOFT) 50 MG tablet TAKE 1 TABLET BY MOUTH EVERY DAY Yes Negro Carreon   SPRINTEC 28 0.25-35 MG-MCG per tablet Take 1 tablet by mouth daily Yes Claude Mings, PA-C   pantoprazole (PROTONIX) 20 MG tablet Take 1 tablet by mouth every morning (before breakfast)  AZ Bruner CNP   hydrOXYzine (ATARAX) 25 MG tablet Take 25 mg by mouth 3 times daily as needed  Historical Provider, MD       Social History     Tobacco Use    Smoking status: Never Smoker    Smokeless tobacco: Never Used   Vaping Use    Vaping Use: Never used   Substance Use Topics    Alcohol use: Never    Drug use: Never        No Known Allergies,   Past Medical History:   Diagnosis Date    Anxiety     Heart murmur     As an infant       PHYSICAL EXAMINATION:  [ INSTRUCTIONS:  \"[x]\" Indicates a positive item  \"[]\" Indicates a negative item  -- DELETE ALL ITEMS NOT EXAMINED]  Vital Signs: (As obtained by patient/caregiver or practitioner observation)    Pt unable to obtain VS.     Constitutional: [x] Appears well-developed and well-nourished [x] No apparent distress      [] Abnormal-   Mental status  [x] Alert and awake  [x] Oriented to person/place/time [x]Able to follow commands      Eyes:  EOM    [x]  Normal  [] Abnormal-  Sclera  []  Normal  [] Abnormal -         Discharge [] None visible  [] Abnormal -    HENT:   [x] Normocephalic, atraumatic. [] Abnormal   [] Mouth/Throat: Mucous membranes are moist.     External Ears [x] Normal  [] Abnormal-     Neck: [x] No visualized mass     Pulmonary/Chest: [x] Respiratory effort normal.  [x] No visualized signs of difficulty breathing or respiratory distress        [] Abnormal-      Musculoskeletal:   [] Normal gait with no signs of ataxia         [x] Normal range of motion of neck        [] Abnormal-       Neurological:        [] No Facial Asymmetry (Cranial nerve 7 motor function) (limited exam to video visit)          [x] No gaze palsy        [] Abnormal-         Skin:        [x] No significant exanthematous lesions or discoloration noted on facial skin         [] Abnormal-            Psychiatric:       [] Normal Affect [x] No Hallucinations        [x] Abnormal- pt appears mildly anxious. ASSESSMENT/PLAN:  1. Moderate episode of recurrent major depressive disorder (HCC)  Continue Sertraline 150 mg daily and will start Wellbutrin 150 mg daily for anxiety and depression. Patient to call if any concerns or SI before her follow up appointment. If she develops suicidal thoughts with a plan, she is instructed to go to emergency room immediately. Continue behavioral counseling.    - buPROPion (WELLBUTRIN XL) 150 MG extended release tablet; Take 1 tablet by mouth every morning  Dispense: 30 tablet; Refill: 0    2. Generalized anxiety disorder  Continue Sertraline 150 mg daily and will start Wellbutrin 150 mg daily for anxiety and depression. Continue behavioral counseling.   - buPROPion (WELLBUTRIN XL) 150 MG extended release tablet; Take 1 tablet by mouth every morning  Dispense: 30 tablet; Refill: 0      Return in about 3 weeks (around 9/30/2021) for Behavioral Health - 30. Dc Jenkins, was evaluated through a synchronous (real-time) audio-video encounter.  The patient (or guardian if applicable) is aware that this is a billable service. Verbal consent to proceed has been obtained within the past 12 months. The visit was conducted pursuant to the emergency declaration under the [address] [place] [address] and the [employer] and [employer] General Act. Patient identification was verified, and a caregiver was present when appropriate. The patient was located in a state where the provider was credentialed to provide care. Total time spent on this encounter: 20 minutes    --AZ Tobar CNP on 9/13/2021 at 11:08 AM    An electronic signature was used to authenticate this note.

## 2021-09-13 DIAGNOSIS — K21.9 GASTROESOPHAGEAL REFLUX DISEASE, UNSPECIFIED WHETHER ESOPHAGITIS PRESENT: ICD-10-CM

## 2021-09-13 DIAGNOSIS — F33.1 MODERATE EPISODE OF RECURRENT MAJOR DEPRESSIVE DISORDER (HCC): ICD-10-CM

## 2021-09-13 DIAGNOSIS — F41.1 GENERALIZED ANXIETY DISORDER: ICD-10-CM

## 2021-09-13 RX ORDER — PANTOPRAZOLE SODIUM 20 MG/1
20 TABLET, DELAYED RELEASE ORAL
Qty: 30 TABLET | Refills: 5 | Status: SHIPPED | OUTPATIENT
Start: 2021-09-13 | End: 2022-03-10

## 2021-09-13 ASSESSMENT — ENCOUNTER SYMPTOMS
WHEEZING: 0
SINUS PAIN: 0
SORE THROAT: 0
SINUS PRESSURE: 0
RHINORRHEA: 0
SHORTNESS OF BREATH: 0

## 2021-09-30 ENCOUNTER — VIRTUAL VISIT (OUTPATIENT)
Dept: FAMILY MEDICINE CLINIC | Age: 21
End: 2021-09-30
Payer: COMMERCIAL

## 2021-09-30 DIAGNOSIS — F41.1 GAD (GENERALIZED ANXIETY DISORDER): ICD-10-CM

## 2021-09-30 DIAGNOSIS — F33.1 MODERATE EPISODE OF RECURRENT MAJOR DEPRESSIVE DISORDER (HCC): Primary | ICD-10-CM

## 2021-09-30 PROCEDURE — 99214 OFFICE O/P EST MOD 30 MIN: CPT | Performed by: NURSE PRACTITIONER

## 2021-09-30 RX ORDER — SERTRALINE HYDROCHLORIDE 100 MG/1
TABLET, FILM COATED ORAL
Qty: 30 TABLET | Refills: 2 | Status: SHIPPED | OUTPATIENT
Start: 2021-09-30 | End: 2022-02-01

## 2021-09-30 RX ORDER — BUPROPION HYDROCHLORIDE 300 MG/1
300 TABLET ORAL EVERY MORNING
Qty: 30 TABLET | Refills: 0 | Status: SHIPPED | OUTPATIENT
Start: 2021-09-30 | End: 2022-02-22

## 2021-09-30 ASSESSMENT — PATIENT HEALTH QUESTIONNAIRE - PHQ9
SUM OF ALL RESPONSES TO PHQ9 QUESTIONS 1 & 2: 4
8. MOVING OR SPEAKING SO SLOWLY THAT OTHER PEOPLE COULD HAVE NOTICED. OR THE OPPOSITE, BEING SO FIGETY OR RESTLESS THAT YOU HAVE BEEN MOVING AROUND A LOT MORE THAN USUAL: 2
4. FEELING TIRED OR HAVING LITTLE ENERGY: 3
6. FEELING BAD ABOUT YOURSELF - OR THAT YOU ARE A FAILURE OR HAVE LET YOURSELF OR YOUR FAMILY DOWN: 3
2. FEELING DOWN, DEPRESSED OR HOPELESS: 1
3. TROUBLE FALLING OR STAYING ASLEEP: 0
SUM OF ALL RESPONSES TO PHQ QUESTIONS 1-9: 15
7. TROUBLE CONCENTRATING ON THINGS, SUCH AS READING THE NEWSPAPER OR WATCHING TELEVISION: 0
1. LITTLE INTEREST OR PLEASURE IN DOING THINGS: 3
9. THOUGHTS THAT YOU WOULD BE BETTER OFF DEAD, OR OF HURTING YOURSELF: 0
5. POOR APPETITE OR OVEREATING: 3

## 2021-09-30 ASSESSMENT — ANXIETY QUESTIONNAIRES
6. BECOMING EASILY ANNOYED OR IRRITABLE: 1
4. TROUBLE RELAXING: 3
7. FEELING AFRAID AS IF SOMETHING AWFUL MIGHT HAPPEN: 1
5. BEING SO RESTLESS THAT IT IS HARD TO SIT STILL: 0
2. NOT BEING ABLE TO STOP OR CONTROL WORRYING: 1
3. WORRYING TOO MUCH ABOUT DIFFERENT THINGS: 3
GAD7 TOTAL SCORE: 11
1. FEELING NERVOUS, ANXIOUS, OR ON EDGE: 2

## 2021-09-30 NOTE — PROGRESS NOTES
2021    TELEHEALTH EVALUATION -- Audio/Visual (During CAIHX-70 public health emergency)    HPI:    Phuc Burnette (:  2000) has requested an audio/video evaluation for the following concern(s): Anxiety/Depression  She is currently prescribed Sertraline 150 mg and Wellbutrin 150 mg daily for anxiety and depression. She also has hydroxyzine 10 mg TID prn for anxiety. She continues to endorse feelings of anxiety, excessive worry, feeling restless,  decreased energy, psychomotor retardation, and mildly depressed mood. Symptoms have been worse over the last week and she is not sure why,  She continues to see her therapist every other week.    She denies auditory/visual hallucinations, self-injurious behaviors, suicidal or homicidal ideations.       Consider low dose klonopin     Historically she has been prescribed the following medications:   Wellbutrin - not effective (April-2020)   [address]- (2019 - present).  She reports that it works well for her anxiety but has done nothing for her depression.    Amitriptyline - for stomach pain.  Medication was discontinue in  2019 because prescribing provider stated that increased dose wouldn't effectively treat her stomach pain.  She notes that when taking Elavil her depression was well controlled and she did not have any difficulties with sleep. Review of Systems   Constitutional: Positive for fatigue. Negative for activity change, appetite change, chills, diaphoresis, fever and unexpected weight change. HENT: Negative for congestion, ear discharge, ear pain, hearing loss, postnasal drip, rhinorrhea, sinus pressure, sinus pain, sneezing, sore throat and tinnitus. Respiratory: Negative for shortness of breath and wheezing. Cardiovascular: Negative for chest pain and palpitations. Skin: Negative for rash. Neurological: Negative for dizziness, tremors, weakness and headaches.    Psychiatric/Behavioral: Positive respiratory depression, death and addiction. Pt verbalized understanding. Pt to continue counseling as scheduled. - buPROPion (WELLBUTRIN XL) 300 MG extended release tablet; Take 1 tablet by mouth every morning  Dispense: 30 tablet; Refill: 0  - sertraline (ZOLOFT) 100 MG tablet; TAKE 1 TABLET BY MOUTH EVERY DAY  Dispense: 30 tablet; Refill: 2  - sertraline (ZOLOFT) 50 MG tablet; TAKE 1 TABLET BY MOUTH EVERY DAY  Dispense: 30 tablet; Refill: 2    2. Moderate episode of recurrent major depressive disorder (HCC)  Continue Sertraline 150 mg daily for anxiety and depression. Will increase Wellbutrin to 300 mg daily. Patient to call if any concerns or SI before her follow up appointment. If she develops suicidal thoughts with a plan, she is instructed to go to emergency room immediately. Continue counseling as scheduled. Follow up with PCP in 3 weeks to assess recent medication changes. - buPROPion (WELLBUTRIN XL) 300 MG extended release tablet; Take 1 tablet by mouth every morning  Dispense: 30 tablet; Refill: 0  - sertraline (ZOLOFT) 100 MG tablet; TAKE 1 TABLET BY MOUTH EVERY DAY  Dispense: 30 tablet; Refill: 2  - sertraline (ZOLOFT) 50 MG tablet; TAKE 1 TABLET BY MOUTH EVERY DAY  Dispense: 30 tablet; Refill: 2      Return in about 4 weeks (around 10/28/2021) for Anxiety, Depression Geri Bernal - PCP). Stephan Cabral, was evaluated through a synchronous (real-time) audio-video encounter. The patient (or guardian if applicable) is aware that this is a billable service. Verbal consent to proceed has been obtained within the past 12 months. The visit was conducted pursuant to the emergency declaration under the [facility], [address] [place] [address] authority and the [employer] and [employer] General Act. Patient identification was verified, and a caregiver was present when appropriate.  The patient was located in a state where the provider was credentialed to provide care. Total time spent on this encounter: 30 minutes    --AZ Shaw CNP on 10/3/2021 at 11:58 AM    An electronic signature was used to authenticate this note.

## 2021-10-03 ASSESSMENT — ENCOUNTER SYMPTOMS
SHORTNESS OF BREATH: 0
SINUS PAIN: 0
WHEEZING: 0
RHINORRHEA: 0
SORE THROAT: 0
SINUS PRESSURE: 0

## 2021-10-14 DIAGNOSIS — F33.1 MODERATE EPISODE OF RECURRENT MAJOR DEPRESSIVE DISORDER (HCC): ICD-10-CM

## 2021-10-14 DIAGNOSIS — F41.1 GAD (GENERALIZED ANXIETY DISORDER): ICD-10-CM

## 2021-12-29 DIAGNOSIS — F33.1 MODERATE EPISODE OF RECURRENT MAJOR DEPRESSIVE DISORDER (HCC): ICD-10-CM

## 2021-12-29 DIAGNOSIS — F41.1 GAD (GENERALIZED ANXIETY DISORDER): ICD-10-CM

## 2022-01-03 DIAGNOSIS — F41.1 GAD (GENERALIZED ANXIETY DISORDER): ICD-10-CM

## 2022-01-03 DIAGNOSIS — F33.1 MODERATE EPISODE OF RECURRENT MAJOR DEPRESSIVE DISORDER (HCC): ICD-10-CM

## 2022-02-01 DIAGNOSIS — N94.3 PREMENSTRUAL SYNDROME: ICD-10-CM

## 2022-02-01 DIAGNOSIS — F41.1 GAD (GENERALIZED ANXIETY DISORDER): ICD-10-CM

## 2022-02-01 DIAGNOSIS — F33.1 MODERATE EPISODE OF RECURRENT MAJOR DEPRESSIVE DISORDER (HCC): ICD-10-CM

## 2022-02-01 RX ORDER — NORGESTIMATE AND ETHINYL ESTRADIOL 0.25-0.035
KIT ORAL
Qty: 28 TABLET | Refills: 2 | Status: SHIPPED | OUTPATIENT
Start: 2022-02-01 | End: 2022-04-11 | Stop reason: SDUPTHER

## 2022-02-01 RX ORDER — SERTRALINE HYDROCHLORIDE 100 MG/1
TABLET, FILM COATED ORAL
Qty: 30 TABLET | Refills: 2 | Status: SHIPPED | OUTPATIENT
Start: 2022-02-01 | End: 2022-02-22 | Stop reason: SDUPTHER

## 2022-02-22 ENCOUNTER — TELEMEDICINE (OUTPATIENT)
Dept: FAMILY MEDICINE CLINIC | Age: 22
End: 2022-02-22
Payer: COMMERCIAL

## 2022-02-22 DIAGNOSIS — R11.2 NAUSEA AND VOMITING, INTRACTABILITY OF VOMITING NOT SPECIFIED, UNSPECIFIED VOMITING TYPE: ICD-10-CM

## 2022-02-22 DIAGNOSIS — K21.9 GASTROESOPHAGEAL REFLUX DISEASE, UNSPECIFIED WHETHER ESOPHAGITIS PRESENT: ICD-10-CM

## 2022-02-22 DIAGNOSIS — F41.1 GAD (GENERALIZED ANXIETY DISORDER): ICD-10-CM

## 2022-02-22 DIAGNOSIS — F33.1 MODERATE EPISODE OF RECURRENT MAJOR DEPRESSIVE DISORDER (HCC): Primary | ICD-10-CM

## 2022-02-22 PROCEDURE — 99214 OFFICE O/P EST MOD 30 MIN: CPT | Performed by: NURSE PRACTITIONER

## 2022-02-22 RX ORDER — SERTRALINE HYDROCHLORIDE 100 MG/1
100 TABLET, FILM COATED ORAL DAILY
Qty: 30 TABLET | Refills: 2 | Status: SHIPPED | OUTPATIENT
Start: 2022-02-22 | End: 2022-06-20 | Stop reason: ALTCHOICE

## 2022-02-22 RX ORDER — ONDANSETRON 4 MG/1
4 TABLET, ORALLY DISINTEGRATING ORAL EVERY 12 HOURS PRN
Qty: 60 TABLET | Refills: 3 | Status: SHIPPED | OUTPATIENT
Start: 2022-02-22 | End: 2022-05-27 | Stop reason: SDUPTHER

## 2022-02-22 ASSESSMENT — PATIENT HEALTH QUESTIONNAIRE - PHQ9
2. FEELING DOWN, DEPRESSED OR HOPELESS: 3
3. TROUBLE FALLING OR STAYING ASLEEP: 3
8. MOVING OR SPEAKING SO SLOWLY THAT OTHER PEOPLE COULD HAVE NOTICED. OR THE OPPOSITE, BEING SO FIGETY OR RESTLESS THAT YOU HAVE BEEN MOVING AROUND A LOT MORE THAN USUAL: 1
SUM OF ALL RESPONSES TO PHQ QUESTIONS 1-9: 16
SUM OF ALL RESPONSES TO PHQ9 QUESTIONS 1 & 2: 6
SUM OF ALL RESPONSES TO PHQ QUESTIONS 1-9: 16
7. TROUBLE CONCENTRATING ON THINGS, SUCH AS READING THE NEWSPAPER OR WATCHING TELEVISION: 1
1. LITTLE INTEREST OR PLEASURE IN DOING THINGS: 3
SUM OF ALL RESPONSES TO PHQ QUESTIONS 1-9: 16
10. IF YOU CHECKED OFF ANY PROBLEMS, HOW DIFFICULT HAVE THESE PROBLEMS MADE IT FOR YOU TO DO YOUR WORK, TAKE CARE OF THINGS AT HOME, OR GET ALONG WITH OTHER PEOPLE: 3
9. THOUGHTS THAT YOU WOULD BE BETTER OFF DEAD, OR OF HURTING YOURSELF: 0
4. FEELING TIRED OR HAVING LITTLE ENERGY: 3
SUM OF ALL RESPONSES TO PHQ QUESTIONS 1-9: 16
5. POOR APPETITE OR OVEREATING: 1
6. FEELING BAD ABOUT YOURSELF - OR THAT YOU ARE A FAILURE OR HAVE LET YOURSELF OR YOUR FAMILY DOWN: 1

## 2022-02-22 ASSESSMENT — ENCOUNTER SYMPTOMS
SINUS PAIN: 0
SHORTNESS OF BREATH: 0
RHINORRHEA: 0
SORE THROAT: 0
SINUS PRESSURE: 0
WHEEZING: 0

## 2022-02-22 ASSESSMENT — ANXIETY QUESTIONNAIRES
1. FEELING NERVOUS, ANXIOUS, OR ON EDGE: 1
5. BEING SO RESTLESS THAT IT IS HARD TO SIT STILL: 0
7. FEELING AFRAID AS IF SOMETHING AWFUL MIGHT HAPPEN: 1
3. WORRYING TOO MUCH ABOUT DIFFERENT THINGS: 1
2. NOT BEING ABLE TO STOP OR CONTROL WORRYING: 1
6. BECOMING EASILY ANNOYED OR IRRITABLE: 3
GAD7 TOTAL SCORE: 7
4. TROUBLE RELAXING: 0

## 2022-02-22 NOTE — PROGRESS NOTES
2022    TELEHEALTH EVALUATION -- Audio/Visual (During PXZCK-45 public health emergency)    HPI:    Pina Villalobos (:  2000) has requested an audio/video evaluation for the following concern(s): Anxiety/Depression  She is currently prescribed Sertraline 150 mg, Wellbutrin 300 mg daily for anxiety and depression. She takes hydroxyzine 25 mg three times a week for severe anxiety. She continues to endorse feelings of being down, depressed or hopelessness, loss of interest in usual activities, appetite fluctuations depending on mood, fatigue, hypersomnolence, feelings of guilt, worthlessness or loss of self confidence, problems concentrating, and psychomotor retardation. Her anxiety is \"ok\" for now. Her  had appendicitis in October - they found a tumor on his appendix. He had a right hemicolectomy in January. He does not have to have chemo/radiation but will have to follow with oncology long term. She feels like she has coped well during this time. She continues to see her therapist every other week.    She denies auditory/visual hallucinations, self-injurious behaviors, suicidal or homicidal ideations.          Historically she has been prescribed the following medications:   Wellbutrin - not effective (April-2020)   Buspar - dizziness  [address] [facility]- (2019 - present).  She reports that it works well for her anxiety but has done nothing for her depression.    Amitriptyline - for stomach pain.  Medication was discontinue in  2019 because prescribing provider stated that increased dose wouldn't effectively treat her stomach pain.  She notes that when taking Elavil her depression was well controlled and she did not have any difficulties with sleep.       Review of Systems   Constitutional: Positive for fatigue. Negative for activity change, appetite change, chills, diaphoresis, fever and unexpected weight change.    HENT: Negative for congestion, ear discharge, ear pain, hearing loss, postnasal drip, rhinorrhea, sinus pressure, sinus pain, sneezing, sore throat and tinnitus. Respiratory: Negative for shortness of breath and wheezing. Cardiovascular: Negative for chest pain and palpitations. Skin: Negative for rash. Neurological: Negative for dizziness, tremors, weakness and headaches. Psychiatric/Behavioral: Positive for dysphoric mood and sleep disturbance. Negative for agitation, behavioral problems, confusion, decreased concentration, hallucinations, self-injury and suicidal ideas. The patient is nervous/anxious. The patient is not hyperactive. Prior to Visit Medications    Medication Sig Taking?  Authorizing Provider   ondansetron (ZOFRAN-ODT) 4 MG disintegrating tablet Take 1 tablet by mouth every 12 hours as needed for Nausea Yes AZ Brand CNP   sertraline (ZOLOFT) 100 MG tablet Take 1 tablet by mouth daily Yes AZ Brand CNP   sertraline (ZOLOFT) 50 MG tablet TAKE 1 TABLET BY MOUTH EVERY DAY Yes AZ Cancino CNP   JONI 0.25-35 MG-MCG per tablet TAKE 1 TABLET BY MOUTH EVERY DAY Yes AZ Brand CNP   pantoprazole (PROTONIX) 20 MG tablet Take 1 tablet by mouth every morning (before breakfast) Yes AZ Brand CNP   hydrOXYzine (ATARAX) 25 MG tablet Take 25 mg by mouth 3 times daily as needed Yes Historical Provider, MD   sucralfate (CARAFATE) 1 GM/10ML suspension Take 10 mLs by mouth 4 times daily  AZ Brand CNP       Social History     Tobacco Use    Smoking status: Never Smoker    Smokeless tobacco: Never Used   Vaping Use    Vaping Use: Never used   Substance Use Topics    Alcohol use: Never    Drug use: Never        Allergies   Allergen Reactions    Pomegranate (Punica Granatum)    ,   Past Medical History:   Diagnosis Date    Anxiety     Heart murmur     As an infant     MSE:  Appearance: alert, cooperative, mild distress  Attention:Intact  Appetite: abnormal: fluctuates depending on mood  Ambulation: unable to assess. Sleep disturbance: Yes; sleeping all of the time  Loss of pleasure: Yes  Speech: normal rate, normal volume and well articulated  Mood: Depressed  Low self-esteem  Affect: depressed affect  Thought Content: hopelessness, worthlessness, intrusive thoughts and cognitive distortions  Insight: Good  Judgment: Impaired  Memory: Intact long-term and Intact short-term  Suicide Assessment: no suicidal ideation  Homicide Assessment: denies current homicidal ideation, plan and intent    ASSESSMENT/PLAN:  1. Moderate episode of recurrent major depressive disorder ([address]  Will refer to [facility] psychiatry to evaluate if [address] is an appropriate intervention for treatment resistant depression.  - External Referral To Psychiatry  - sertraline (ZOLOFT) 100 MG tablet; Take 1 tablet by mouth daily  Dispense: 30 tablet; Refill: 2  - sertraline (ZOLOFT) 50 MG tablet; TAKE 1 TABLET BY MOUTH EVERY DAY  Dispense: 30 tablet; Refill: 2    2. ELOISA (generalized anxiety disorder)  Continue current medication. Continue therapy as scheduled. - sertraline (ZOLOFT) 100 MG tablet; Take 1 tablet by mouth daily  Dispense: 30 tablet; Refill: 2  - sertraline (ZOLOFT) 50 MG tablet; TAKE 1 TABLET BY MOUTH EVERY DAY  Dispense: 30 tablet; Refill: 2    3. Gastroesophageal reflux disease, unspecified whether esophagitis present  Avoid lying flat until 2 hours after eating. Elevated head of bed. Reduce size of your meals including the amount of fatty, spicy, and/or acidic foods, caffeine, and sweets. Stop smoking, reduce alcohol intake. Lose weight if you are overweight.  - ondansetron (ZOFRAN-ODT) 4 MG disintegrating tablet; Take 1 tablet by mouth every 12 hours as needed for Nausea  Dispense: 60 tablet; Refill: 3    4. Nausea and vomiting, intractability of vomiting not specified, unspecified vomiting type  - ondansetron (ZOFRAN-ODT) 4 MG disintegrating tablet;  Take 1 tablet by mouth every 12 hours as needed for Nausea Dispense: 60 tablet; Refill: 3      Return in about 3 months (around 5/22/2022) for Depression, Anxiety. Vannessa Yan, was evaluated through a synchronous (real-time) audio-video encounter. The patient (or guardian if applicable) is aware that this is a billable service, which includes applicable co-pays. This Virtual Visit was conducted with patient's (and/or legal guardian's) consent. The visit was conducted pursuant to the emergency declaration under the [address] [place] [address], [address] [place] [address] authority and the [email or web address] and [employer] General Act. Patient identification was verified, and a caregiver was present when appropriate. The patient was located at home in a state where the provider was licensed to provide care. Total time spent on this encounter: 30 minutes    --AZ Barragan CNP on 2/22/2022 at 5:08 PM    An electronic signature was used to authenticate this note.

## 2022-03-10 DIAGNOSIS — K21.9 GASTROESOPHAGEAL REFLUX DISEASE, UNSPECIFIED WHETHER ESOPHAGITIS PRESENT: ICD-10-CM

## 2022-03-10 RX ORDER — PANTOPRAZOLE SODIUM 20 MG/1
TABLET, DELAYED RELEASE ORAL
Qty: 90 TABLET | Refills: 1 | Status: SHIPPED | OUTPATIENT
Start: 2022-03-10 | End: 2022-08-15

## 2022-04-06 ENCOUNTER — TELEPHONE (OUTPATIENT)
Dept: FAMILY MEDICINE CLINIC | Age: 22
End: 2022-04-06

## 2022-04-06 NOTE — TELEPHONE ENCOUNTER
Patient is inquiring about her Genesight testing that was to be mailed to her. Detail Level: Zone Other (Free Text): Informed patient that she has healed very well.\\nDiscussed with patient that her scar is tight, but will soften over time. Also discussed with patient that she can use Silagen to help soften the scar. \\nExplained to patient that it's normal to have random shocks of pain. Discussed with patient that it's due to all the trauma from surgeries, and reside overtime.\\nPatient can resume back to normal activity.\\nShe can return to clinic as needed.

## 2022-04-07 NOTE — TELEPHONE ENCOUNTER
Please verify pt's mailing address. Per [employer] website, the test has been delivered. I recommend that she call the company at [phone].

## 2022-04-11 DIAGNOSIS — N94.3 PREMENSTRUAL SYNDROME: ICD-10-CM

## 2022-04-11 RX ORDER — NORGESTIMATE AND ETHINYL ESTRADIOL 0.25-0.035
KIT ORAL
Qty: 28 TABLET | Refills: 11 | Status: SHIPPED | OUTPATIENT
Start: 2022-04-11

## 2022-05-27 ENCOUNTER — TELEMEDICINE (OUTPATIENT)
Dept: FAMILY MEDICINE CLINIC | Age: 22
End: 2022-05-27
Payer: COMMERCIAL

## 2022-05-27 DIAGNOSIS — E88.89 CYP2C9 INTERMEDIATE METABOLIZER (HCC): ICD-10-CM

## 2022-05-27 DIAGNOSIS — F41.0 GENERALIZED ANXIETY DISORDER WITH PANIC ATTACKS: ICD-10-CM

## 2022-05-27 DIAGNOSIS — E88.89: ICD-10-CM

## 2022-05-27 DIAGNOSIS — F41.1 GENERALIZED ANXIETY DISORDER WITH PANIC ATTACKS: ICD-10-CM

## 2022-05-27 DIAGNOSIS — Z15.89 MTHFR GENE MUTATION: ICD-10-CM

## 2022-05-27 DIAGNOSIS — K21.9 GASTROESOPHAGEAL REFLUX DISEASE, UNSPECIFIED WHETHER ESOPHAGITIS PRESENT: ICD-10-CM

## 2022-05-27 DIAGNOSIS — F34.1 DYSTHYMIA: Primary | ICD-10-CM

## 2022-05-27 DIAGNOSIS — E88.89 CYP2B6 INTERMEDIATE METABOLIZER (HCC): ICD-10-CM

## 2022-05-27 PROCEDURE — G8427 DOCREV CUR MEDS BY ELIG CLIN: HCPCS | Performed by: NURSE PRACTITIONER

## 2022-05-27 PROCEDURE — 99214 OFFICE O/P EST MOD 30 MIN: CPT | Performed by: NURSE PRACTITIONER

## 2022-05-27 RX ORDER — LEVOMEFOLATE CALCIUM 7.5 MG TABLET
7.5 TABLET DAILY
Qty: 30 TABLET | Refills: 0 | Status: SHIPPED | OUTPATIENT
Start: 2022-05-27 | End: 2022-06-20 | Stop reason: SDUPTHER

## 2022-05-27 RX ORDER — ONDANSETRON 4 MG/1
4 TABLET, ORALLY DISINTEGRATING ORAL EVERY 12 HOURS PRN
Qty: 60 TABLET | Refills: 3 | Status: SHIPPED | OUTPATIENT
Start: 2022-05-27

## 2022-05-27 RX ORDER — DESVENLAFAXINE 25 MG/1
25 TABLET, EXTENDED RELEASE ORAL DAILY
Qty: 30 TABLET | Refills: 0 | Status: SHIPPED | OUTPATIENT
Start: 2022-05-27 | End: 2022-06-20 | Stop reason: DRUGHIGH

## 2022-05-27 ASSESSMENT — ANXIETY QUESTIONNAIRES
7. FEELING AFRAID AS IF SOMETHING AWFUL MIGHT HAPPEN: 1
5. BEING SO RESTLESS THAT IT IS HARD TO SIT STILL: 1
2. NOT BEING ABLE TO STOP OR CONTROL WORRYING: 3
6. BECOMING EASILY ANNOYED OR IRRITABLE: 1
GAD7 TOTAL SCORE: 15
4. TROUBLE RELAXING: 3
3. WORRYING TOO MUCH ABOUT DIFFERENT THINGS: 3
IF YOU CHECKED OFF ANY PROBLEMS ON THIS QUESTIONNAIRE, HOW DIFFICULT HAVE THESE PROBLEMS MADE IT FOR YOU TO DO YOUR WORK, TAKE CARE OF THINGS AT HOME, OR GET ALONG WITH OTHER PEOPLE: VERY DIFFICULT
1. FEELING NERVOUS, ANXIOUS, OR ON EDGE: 3

## 2022-05-27 ASSESSMENT — PATIENT HEALTH QUESTIONNAIRE - PHQ9
2. FEELING DOWN, DEPRESSED OR HOPELESS: 1
3. TROUBLE FALLING OR STAYING ASLEEP: 3
SUM OF ALL RESPONSES TO PHQ QUESTIONS 1-9: 12
5. POOR APPETITE OR OVEREATING: 1
4. FEELING TIRED OR HAVING LITTLE ENERGY: 3
7. TROUBLE CONCENTRATING ON THINGS, SUCH AS READING THE NEWSPAPER OR WATCHING TELEVISION: 1
10. IF YOU CHECKED OFF ANY PROBLEMS, HOW DIFFICULT HAVE THESE PROBLEMS MADE IT FOR YOU TO DO YOUR WORK, TAKE CARE OF THINGS AT HOME, OR GET ALONG WITH OTHER PEOPLE: 1
SUM OF ALL RESPONSES TO PHQ9 QUESTIONS 1 & 2: 2
6. FEELING BAD ABOUT YOURSELF - OR THAT YOU ARE A FAILURE OR HAVE LET YOURSELF OR YOUR FAMILY DOWN: 1
9. THOUGHTS THAT YOU WOULD BE BETTER OFF DEAD, OR OF HURTING YOURSELF: 0
8. MOVING OR SPEAKING SO SLOWLY THAT OTHER PEOPLE COULD HAVE NOTICED. OR THE OPPOSITE, BEING SO FIGETY OR RESTLESS THAT YOU HAVE BEEN MOVING AROUND A LOT MORE THAN USUAL: 1
1. LITTLE INTEREST OR PLEASURE IN DOING THINGS: 1

## 2022-05-27 NOTE — PROGRESS NOTES
2022    TELEHEALTH EVALUATION -- Audio/Visual (During DTADP-10 public health emergency)    HPI:    Sayda Acuña (:  2000) has requested an audio/video evaluation for medication refills and review of Genesight results. Anxiety/Depression  She is currently prescribed Sertraline with minimal response. She recently completed Genesight Pharmacological test which shows that she is an intermediate metablizer meaning that she has less activity at [id number], CYP2B6, and CY. It is also noted that she has a MTHFR gene mutation resulting in reduced folic acid conversion. She continues to endorse the following depressive symptoms: feelings of being down, depressed or hopelessness. , loss of interest in usual activities, fatigue, sleep disturbance, feelings of guilt, worthlessness or loss of self confidence, problems concentrating, agitation, psychomotor retardation and appetite fluctuations depending on mood. She denies visual  and auditory hallucinations, delusions, illusions and paranoia. Pt denies current suicidal ideation, plan and intent. Pt  denies current homicidal ideation, plan and intent. She continues to see her therapist weekly. Review of Systems   Constitutional: Positive for fatigue. Negative for activity change, appetite change, chills, diaphoresis, fever and unexpected weight change. HENT: Negative for congestion, ear discharge, ear pain, hearing loss, postnasal drip, rhinorrhea, sinus pressure, sinus pain, sneezing, sore throat and tinnitus. Respiratory: Negative for shortness of breath and wheezing. Cardiovascular: Negative for chest pain and palpitations. Gastrointestinal:        Hx of GERD; symptoms are well controlled   Skin: Negative for rash. Neurological: Negative for dizziness, tremors, weakness and headaches. Psychiatric/Behavioral: Positive for dysphoric mood and sleep disturbance.  Negative for agitation, behavioral problems, confusion, decreased concentration, hallucinations, self-injury and suicidal ideas. The patient is nervous/anxious. The patient is not hyperactive. Prior to Visit Medications    Medication Sig Taking? Authorizing Provider   methylfolate (DEPLIN) 7.5 MG TABS tablet Take 1 tablet by mouth daily Yes AZ Wynn NP   desvenlafaxine succinate (PRISTIQ) 25 MG TB24 extended release tablet Take 1 tablet by mouth daily Yes AZ Galicia NP   ondansetron (ZOFRAN-ODT) 4 MG disintegrating tablet Take 1 tablet by mouth every 12 hours as needed for Nausea Yes AZ Frey NP   JONI 0.25-35 MG-MCG per tablet TAKE 1 TABLET BY MOUTH EVERY DAY Yes AZ Galicia NP   pantoprazole (PROTONIX) 20 MG tablet TAKE 1 TABLET BY MOUTH EVERY DAY BEFORE BREAKFAST Yes AZ Frey NP   sertraline (ZOLOFT) 100 MG tablet Take 1 tablet by mouth daily Yes AZ Frey NP   hydrOXYzine (ATARAX) 25 MG tablet Take 25 mg by mouth 3 times daily as needed Yes Historical Provider, MD   sucralfate (CARAFATE) 1 GM/10ML suspension Take 10 mLs by mouth 4 times daily Yes AZ Frey NP       Social History     Tobacco Use    Smoking status: Never Smoker    Smokeless tobacco: Never Used   Vaping Use    Vaping Use: Never used   Substance Use Topics    Alcohol use: Never    Drug use: Never        Allergies   Allergen Reactions    Pomegranate (Punica Granatum)    ,   Past Medical History:   Diagnosis Date    Anxiety     Heart murmur     As an infant       MSE:  Appearance: alert, cooperative, mild distress  Attention:Limited  Appetite: abnormal: fluctuates depending on mood  Ambulation: unable to assess.    Sleep disturbance: Yes  Loss of pleasure: Yes  Speech: normal rate and normal volume  Mood: Anxious  Depressed  Affect: anxiety  Thought Content: cognitive distortions  Insight: Fair  Judgment: Impaired  Memory: Intact long-term and Intact short-term  Suicide Assessment: no suicidal ideation  Homicide Assessment: denies current homicidal ideation, plan and intent    ASSESSMENT/PLAN:  1. Dysthymia  Will wean off Sertraline and start Pristiq 25 mg for anxiety and depression. Patient to call if any concerns or SI before their follow up appointment. If she develops suicidal thoughts with a plan, she is instructed to go to emergency room immediately. Continue counseling as scheduled. - desvenlafaxine succinate (PRISTIQ) 25 MG TB24 extended release tablet; Take 1 tablet by mouth daily  Dispense: 30 tablet; Refill: 0    2. Generalized anxiety disorder with panic attacks  Will wean off Sertraline and start Pristiq 25 mg for anxiety and depression. Continue counseling as scheduled. - desvenlafaxine succinate (PRISTIQ) 25 MG TB24 extended release tablet; Take 1 tablet by mouth daily  Dispense: 30 tablet; Refill: 0    3. MTHFR gene mutation  Will start Deplin to help enhance production of the neurotransmitters and help to manage symptoms of depression by augmenting the therapeutic benefits of antidepressants. - methylfolate (DEPLIN) 7.5 MG TABS tablet; Take 1 tablet by mouth daily  Dispense: 30 tablet; Refill: 0    4. CYP2C9 intermediate metabolizer (HCC)  - desvenlafaxine succinate (PRISTIQ) 25 MG TB24 extended release tablet; Take 1 tablet by mouth daily  Dispense: 30 tablet; Refill: 0    5. 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 Gastroesophageal reflux disease, unspecified whether esophagitis present  Avoid lying flat until 2 hours after eating. Elevated head of bed.   Reduce size of your meals including the amount of fatty, spicy, and/or acidic foods, caffeine, and sweets. Stop smoking, reduce alcohol intake. Lose weight if you are overweight.  - ondansetron (ZOFRAN-ODT) 4 MG disintegrating tablet; Take 1 tablet by mouth every 12 hours as needed for Nausea  Dispense: 60 tablet; Refill: 3      Return in about 3 weeks (around 6/17/2022) for Anxiety, Depression. Rosana Thayer, was evaluated through a synchronous (real-time) audio-video encounter. The patient (or guardian if applicable) is aware that this is a billable service, which includes applicable co-pays. This Virtual Visit was conducted with patient's (and/or legal guardian's) consent. The visit was conducted pursuant to the emergency declaration under the [address] [place] [address], [address] [place] [address] authority and the [employer] and [employer] General Act. Patient identification was verified, and a caregiver was present when appropriate. The patient was located at Home: [place] [id number]. Provider was located at Home ([address]: [place]. Total time spent on this encounter: 30 minutes    --AZ Lowe NP on 5/31/2022 at 3:30 PM    An electronic signature was used to authenticate this note.

## 2022-05-31 ASSESSMENT — ENCOUNTER SYMPTOMS
SINUS PAIN: 0
WHEEZING: 0
SINUS PRESSURE: 0
SHORTNESS OF BREATH: 0
RHINORRHEA: 0
SORE THROAT: 0

## 2022-06-20 ENCOUNTER — TELEMEDICINE (OUTPATIENT)
Dept: FAMILY MEDICINE CLINIC | Age: 22
End: 2022-06-20
Payer: COMMERCIAL

## 2022-06-20 DIAGNOSIS — F34.1 DYSTHYMIA: Primary | ICD-10-CM

## 2022-06-20 DIAGNOSIS — F41.0 GENERALIZED ANXIETY DISORDER WITH PANIC ATTACKS: ICD-10-CM

## 2022-06-20 DIAGNOSIS — Z15.89 MTHFR GENE MUTATION: ICD-10-CM

## 2022-06-20 DIAGNOSIS — F41.1 GENERALIZED ANXIETY DISORDER WITH PANIC ATTACKS: ICD-10-CM

## 2022-06-20 PROCEDURE — G8427 DOCREV CUR MEDS BY ELIG CLIN: HCPCS | Performed by: NURSE PRACTITIONER

## 2022-06-20 PROCEDURE — 99214 OFFICE O/P EST MOD 30 MIN: CPT | Performed by: NURSE PRACTITIONER

## 2022-06-20 RX ORDER — LEVOMEFOLATE CALCIUM 7.5 MG TABLET
7.5 TABLET DAILY
Qty: 30 TABLET | Refills: 1 | Status: SHIPPED | OUTPATIENT
Start: 2022-06-20 | End: 2022-07-14 | Stop reason: SDUPTHER

## 2022-06-20 RX ORDER — DESVENLAFAXINE 25 MG/1
25 TABLET, EXTENDED RELEASE ORAL DAILY
Qty: 30 TABLET | Refills: 0 | Status: SHIPPED | OUTPATIENT
Start: 2022-06-20 | End: 2022-06-22 | Stop reason: SDUPTHER

## 2022-06-20 RX ORDER — DESVENLAFAXINE 50 MG/1
50 TABLET, EXTENDED RELEASE ORAL DAILY
Qty: 30 TABLET | Refills: 1 | Status: SHIPPED | OUTPATIENT
Start: 2022-06-20 | End: 2022-06-20 | Stop reason: CLARIF

## 2022-06-20 ASSESSMENT — PATIENT HEALTH QUESTIONNAIRE - PHQ9
5. POOR APPETITE OR OVEREATING: 2
10. IF YOU CHECKED OFF ANY PROBLEMS, HOW DIFFICULT HAVE THESE PROBLEMS MADE IT FOR YOU TO DO YOUR WORK, TAKE CARE OF THINGS AT HOME, OR GET ALONG WITH OTHER PEOPLE: 2
6. FEELING BAD ABOUT YOURSELF - OR THAT YOU ARE A FAILURE OR HAVE LET YOURSELF OR YOUR FAMILY DOWN: 1
SUM OF ALL RESPONSES TO PHQ9 QUESTIONS 1 & 2: 3
SUM OF ALL RESPONSES TO PHQ QUESTIONS 1-9: 14
7. TROUBLE CONCENTRATING ON THINGS, SUCH AS READING THE NEWSPAPER OR WATCHING TELEVISION: 1
SUM OF ALL RESPONSES TO PHQ QUESTIONS 1-9: 14
1. LITTLE INTEREST OR PLEASURE IN DOING THINGS: 1
4. FEELING TIRED OR HAVING LITTLE ENERGY: 3
8. MOVING OR SPEAKING SO SLOWLY THAT OTHER PEOPLE COULD HAVE NOTICED. OR THE OPPOSITE, BEING SO FIGETY OR RESTLESS THAT YOU HAVE BEEN MOVING AROUND A LOT MORE THAN USUAL: 2
SUM OF ALL RESPONSES TO PHQ QUESTIONS 1-9: 14
SUM OF ALL RESPONSES TO PHQ QUESTIONS 1-9: 14
3. TROUBLE FALLING OR STAYING ASLEEP: 2
2. FEELING DOWN, DEPRESSED OR HOPELESS: 2
9. THOUGHTS THAT YOU WOULD BE BETTER OFF DEAD, OR OF HURTING YOURSELF: 0

## 2022-06-20 ASSESSMENT — ENCOUNTER SYMPTOMS
WHEEZING: 0
SHORTNESS OF BREATH: 0
SINUS PAIN: 0
SORE THROAT: 0
RHINORRHEA: 0
SINUS PRESSURE: 0

## 2022-06-20 ASSESSMENT — ANXIETY QUESTIONNAIRES
IF YOU CHECKED OFF ANY PROBLEMS ON THIS QUESTIONNAIRE, HOW DIFFICULT HAVE THESE PROBLEMS MADE IT FOR YOU TO DO YOUR WORK, TAKE CARE OF THINGS AT HOME, OR GET ALONG WITH OTHER PEOPLE: SOMEWHAT DIFFICULT
7. FEELING AFRAID AS IF SOMETHING AWFUL MIGHT HAPPEN: 2
GAD7 TOTAL SCORE: 11
3. WORRYING TOO MUCH ABOUT DIFFERENT THINGS: 2
5. BEING SO RESTLESS THAT IT IS HARD TO SIT STILL: 0
2. NOT BEING ABLE TO STOP OR CONTROL WORRYING: 2
6. BECOMING EASILY ANNOYED OR IRRITABLE: 3
1. FEELING NERVOUS, ANXIOUS, OR ON EDGE: 2
4. TROUBLE RELAXING: 0

## 2022-06-20 NOTE — PROGRESS NOTES
2022    TELEHEALTH EVALUATION -- Audio/Visual (During QHMDR-77 public health emergency)    HPI:    Rosana Thayer (:  2000) has requested an audio/video evaluation for the following concern(s): Anxiety/Depression  Kenan Fang is currently prescribed Pristiq 25 mg daily for anxiety/depression and Deplin 7.5 mg daily for MTHFR mutation. She endorses the following depressive symptoms: feelings of being down, depressed or hopelessness, loss of interest in usual activities, flucutationg appetite, feelings of guilt, worthlessness or loss of self confidence, problems concentrating, fatigue and sleep difficulties. She denies visual  and auditory hallucinations, delusions, illusions and paranoia. Pt denies current suicidal ideation, plan and intent. Pt  denies current homicidal ideation, plan and intent. Anxiety  The following anxiety symptoms are present: excessive worry, agitation, fearfulness, labile mood, insomnia and depression. She would like to continue her current dose of Pristiq until she gets back from vacation. Review of Systems   Constitutional: Positive for fatigue. Negative for activity change, appetite change, chills, diaphoresis, fever and unexpected weight change. HENT: Negative for congestion, ear discharge, ear pain, hearing loss, postnasal drip, rhinorrhea, sinus pressure, sinus pain, sneezing, sore throat and tinnitus. Respiratory: Negative for shortness of breath and wheezing. Cardiovascular: Negative for chest pain and palpitations. Gastrointestinal:        Hx of GERD; symptoms are well controlled   Skin: Negative for rash. Neurological: Negative for dizziness, tremors, weakness and headaches. Psychiatric/Behavioral: Positive for dysphoric mood and sleep disturbance. Negative for agitation, behavioral problems, confusion, decreased concentration, hallucinations, self-injury and suicidal ideas. The patient is nervous/anxious. The patient is not hyperactive. Prior to Visit Medications    Medication Sig Taking? Authorizing Provider   methylfolate (DEPLIN) 7.5 MG TABS tablet Take 1 tablet by mouth daily Yes AZ Coronado NP   ondansetron (ZOFRAN-ODT) 4 MG disintegrating tablet Take 1 tablet by mouth every 12 hours as needed for Nausea Yes Sotero Jump Swank, APRN - NP   JONI 0.25-35 MG-MCG per tablet TAKE 1 TABLET BY MOUTH EVERY DAY Yes AZ Coronado NP   pantoprazole (PROTONIX) 20 MG tablet TAKE 1 TABLET BY MOUTH EVERY DAY BEFORE BREAKFAST Yes Rika Jones, APRN - NP   hydrOXYzine (ATARAX) 25 MG tablet Take 25 mg by mouth 3 times daily as needed Yes Historical Provider, MD   sucralfate (CARAFATE) 1 GM/10ML suspension Take 10 mLs by mouth 4 times daily Yes Sotero Jump Swank, AZ Rosario NP   desvenlafaxine succinate (PRISTIQ) 25 MG TB24 extended release tablet Take 1 tablet by mouth daily  AZ Coronado NP       Social History     Tobacco Use    Smoking status: Never Smoker    Smokeless tobacco: Never Used   Vaping Use    Vaping Use: Never used   Substance Use Topics    Alcohol use: Never    Drug use: Never        Allergies   Allergen Reactions    Pomegranate (Punica Granatum)    ,   Past Medical History:   Diagnosis Date    Anxiety     Heart murmur     As an infant     MSE:  Appearance: alert, cooperative, mild distress  Attention:Intact  Appetite: abnormal: fluctuates depending on mood  Ambulation: unable to assess. Sleep disturbance: Yes  Loss of pleasure: Yes  Speech: normal rate, normal volume and well articulated  Mood: Anxious  Depressed  Low self-esteem  Affect: anxiety  Thought Content: cognitive distortions  Insight: Fair  Judgment: Intact  Memory: Intact long-term and Intact short-term  Suicide Assessment: no suicidal ideation  Homicide Assessment: denies current homicidal ideation, plan and intent     ASSESSMENT/PLAN:    1. Dysthymia  Stable, continue Pristiq 25 mg daily for anxiety and depression.   Patient to call if any concerns or SI before their follow up appointment. If he develops suicidal thoughts with a plan, he is instructed to go to emergency room immediately. Behavioral counseling recommended. 2. Generalized anxiety disorder with panic attacks  Stable, continue Pristiq 25 mg daily for anxiety and depression    3. MTHFR gene mutation  Continue Deplin 7.5 mg daily. - methylfolate (DEPLIN) 7.5 MG TABS tablet; Take 1 tablet by mouth daily  Dispense: 30 tablet; Refill: 1      Return in about 3 weeks (around 7/11/2022) for Anxiety, Depression. Antonette Perez, was evaluated through a synchronous (real-time) audio-video encounter. The patient (or guardian if applicable) is aware that this is a billable service, which includes applicable co-pays. This Virtual Visit was conducted with patient's (and/or legal guardian's) consent. The visit was conducted pursuant to the emergency declaration under the [address] [place] [address], [address] [place] [address] authority and the [employer] General Act. Patient identification was verified, and a caregiver was present when appropriate. The patient was located at Home: [place] [address]. Provider was located at [place] [address] (Appt Dept): [address]. Total time spent on this encounter: 30 minutes    --AZ Ramsey NP on 6/24/2022 at 1:52 PM    An electronic signature was used to authenticate this note.

## 2022-06-22 DIAGNOSIS — N94.3 PREMENSTRUAL SYNDROME: ICD-10-CM

## 2022-06-22 RX ORDER — DESVENLAFAXINE 25 MG/1
25 TABLET, EXTENDED RELEASE ORAL DAILY
Qty: 30 TABLET | Refills: 0 | Status: SHIPPED | OUTPATIENT
Start: 2022-06-22 | End: 2022-07-14 | Stop reason: DRUGHIGH

## 2022-06-23 RX ORDER — NORGESTIMATE AND ETHINYL ESTRADIOL 0.25-0.035
KIT ORAL
Qty: 28 TABLET | Refills: 11 | OUTPATIENT
Start: 2022-06-23

## 2022-06-23 RX ORDER — DESVENLAFAXINE 25 MG/1
25 TABLET, EXTENDED RELEASE ORAL DAILY
Qty: 30 TABLET | Refills: 0 | OUTPATIENT
Start: 2022-06-23

## 2022-07-14 ENCOUNTER — TELEMEDICINE (OUTPATIENT)
Dept: FAMILY MEDICINE CLINIC | Age: 22
End: 2022-07-14
Payer: COMMERCIAL

## 2022-07-14 DIAGNOSIS — F41.1 GAD (GENERALIZED ANXIETY DISORDER): ICD-10-CM

## 2022-07-14 DIAGNOSIS — F34.1 DYSTHYMIA: Primary | ICD-10-CM

## 2022-07-14 DIAGNOSIS — Z15.89 MTHFR GENE MUTATION: ICD-10-CM

## 2022-07-14 PROCEDURE — 99214 OFFICE O/P EST MOD 30 MIN: CPT | Performed by: NURSE PRACTITIONER

## 2022-07-14 PROCEDURE — G8427 DOCREV CUR MEDS BY ELIG CLIN: HCPCS | Performed by: NURSE PRACTITIONER

## 2022-07-14 RX ORDER — DESVENLAFAXINE 50 MG/1
50 TABLET, EXTENDED RELEASE ORAL DAILY
Qty: 30 TABLET | Refills: 0 | Status: SHIPPED | OUTPATIENT
Start: 2022-07-14 | End: 2022-08-04 | Stop reason: DRUGHIGH

## 2022-07-14 RX ORDER — LEVOMEFOLATE CALCIUM 7.5 MG TABLET
7.5 TABLET DAILY
Qty: 30 TABLET | Refills: 11 | Status: SHIPPED | OUTPATIENT
Start: 2022-07-14 | End: 2022-10-25

## 2022-07-14 ASSESSMENT — PATIENT HEALTH QUESTIONNAIRE - PHQ9
5. POOR APPETITE OR OVEREATING: 2
SUM OF ALL RESPONSES TO PHQ QUESTIONS 1-9: 16
2. FEELING DOWN, DEPRESSED OR HOPELESS: 2
3. TROUBLE FALLING OR STAYING ASLEEP: 2
6. FEELING BAD ABOUT YOURSELF - OR THAT YOU ARE A FAILURE OR HAVE LET YOURSELF OR YOUR FAMILY DOWN: 1
SUM OF ALL RESPONSES TO PHQ QUESTIONS 1-9: 16
8. MOVING OR SPEAKING SO SLOWLY THAT OTHER PEOPLE COULD HAVE NOTICED. OR THE OPPOSITE, BEING SO FIGETY OR RESTLESS THAT YOU HAVE BEEN MOVING AROUND A LOT MORE THAN USUAL: 2
SUM OF ALL RESPONSES TO PHQ QUESTIONS 1-9: 16
9. THOUGHTS THAT YOU WOULD BE BETTER OFF DEAD, OR OF HURTING YOURSELF: 0
SUM OF ALL RESPONSES TO PHQ9 QUESTIONS 1 & 2: 4
1. LITTLE INTEREST OR PLEASURE IN DOING THINGS: 2
SUM OF ALL RESPONSES TO PHQ QUESTIONS 1-9: 16
7. TROUBLE CONCENTRATING ON THINGS, SUCH AS READING THE NEWSPAPER OR WATCHING TELEVISION: 2
4. FEELING TIRED OR HAVING LITTLE ENERGY: 3
10. IF YOU CHECKED OFF ANY PROBLEMS, HOW DIFFICULT HAVE THESE PROBLEMS MADE IT FOR YOU TO DO YOUR WORK, TAKE CARE OF THINGS AT HOME, OR GET ALONG WITH OTHER PEOPLE: 1

## 2022-07-14 ASSESSMENT — ANXIETY QUESTIONNAIRES
3. WORRYING TOO MUCH ABOUT DIFFERENT THINGS: 3
5. BEING SO RESTLESS THAT IT IS HARD TO SIT STILL: 0
GAD7 TOTAL SCORE: 14
IF YOU CHECKED OFF ANY PROBLEMS ON THIS QUESTIONNAIRE, HOW DIFFICULT HAVE THESE PROBLEMS MADE IT FOR YOU TO DO YOUR WORK, TAKE CARE OF THINGS AT HOME, OR GET ALONG WITH OTHER PEOPLE: VERY DIFFICULT
6. BECOMING EASILY ANNOYED OR IRRITABLE: 2
2. NOT BEING ABLE TO STOP OR CONTROL WORRYING: 3
1. FEELING NERVOUS, ANXIOUS, OR ON EDGE: 3
7. FEELING AFRAID AS IF SOMETHING AWFUL MIGHT HAPPEN: 2
4. TROUBLE RELAXING: 1

## 2022-07-14 NOTE — PROGRESS NOTES
Behavioral Health Follow-Up  Tosha Bach, FNP-C, PMHNP-BC      Time spent with Patient:  30 minutes  This was a outpatient visit. Patient Location: [address] [place] [address]  Provider Location: [facility]    [place]:Pristiq 25 mg daily for anxiety/depression and Deplin 7.5 mg daily for MTHFR mutation. She endorses the following depressive symptoms: feelings of being down, depressed or hopelessness, loss of interest in usual activities, flucutationg appetite, feelings of guilt, worthlessness or loss of self confidence, problems concentrating, fatigue and sleep difficulties. She denies visual  and auditory hallucinations, delusions, illusions and paranoia. Pt denies current suicidal ideation, plan and intent. Pt  denies current homicidal ideation, plan and intent. Anxiety  The following anxiety symptoms are present: excessive worry, agitation, fearfulness, labile mood, insomnia and depression. S    Historically she has been prescribed the following medications: Wellbutrin - not effective (April-June 2020)   Buspar - dizziness  Lexapro - (March 2019 - present). She reports that it works well for her anxiety but has done nothing for her depression. Amitriptyline - for stomach pain. Medication was discontinue in  02/2019 because prescribing provider stated that increased dose wouldn't effectively treat her stomach pain. She notes that when taking Elavil her depression was well controlled and she did not have any difficulties with sleep.     Melatonin 10 mg at HS    Past Medical History:   Diagnosis Date    Anxiety     Heart murmur     As an infant          Current Outpatient Medications:     desvenlafaxine succinate (PRISTIQ) 25 MG TB24 extended release tablet, Take 1 tablet by mouth daily, Disp: 30 tablet, Rfl: 0    methylfolate (DEPLIN) 7.5 MG TABS tablet, Take 1 tablet by mouth daily, Disp: 30 tablet, Rfl: 1    ondansetron (ZOFRAN-ODT) 4 MG disintegrating tablet, Take 1 tablet by mouth every 12 hours as needed for Nausea, Disp: 60 tablet, Rfl: 3    JONI 0.25-35 MG-MCG per tablet, TAKE 1 TABLET BY MOUTH EVERY DAY, Disp: 28 tablet, Rfl: 11    pantoprazole (PROTONIX) 20 MG tablet, TAKE 1 TABLET BY MOUTH EVERY DAY BEFORE BREAKFAST, Disp: 90 tablet, Rfl: 1    hydrOXYzine (ATARAX) 25 MG tablet, Take 25 mg by mouth 3 times daily as needed, Disp: , Rfl:     sucralfate (CARAFATE) 1 GM/10ML suspension, Take 10 mLs by mouth 4 times daily, Disp: 1200 mL, Rfl: 1     Family History   Problem Relation Age of Onset    Other Mother         AV entry Tachycardia    Arthritis Mother     Irritable Bowel Syndrome Mother     Diabetes Father     Cancer Sister         Undifferenciated Round Cell Tumor        Social History     Socioeconomic History    Marital status:      Spouse name: Not on file    Number of children: Not on file    Years of education: Not on file    Highest education level: Not on file   Occupational History    Not on file   Tobacco Use    Smoking status: Never Smoker    Smokeless tobacco: Never Used   Vaping Use    Vaping Use: Never used   Substance and Sexual Activity    Alcohol use: Never    Drug use: Never    Sexual activity: Not on file   Other Topics Concern    Not on file   Social History Narrative    Not on file     Social Determinants of Health     Financial Resource Strain:     Difficulty of Paying Living Expenses: Not on file   Food Insecurity:     Worried About Running Out of Food in the Last Year: Not on file    Ran Out of Food in the Last Year: Not on file   Transportation Needs:     Lack of Transportation (Medical): Not on file    Lack of Transportation (Non-Medical):  Not on file   Physical Activity:     Days of Exercise per Week: Not on file    Minutes of Exercise per Session: Not on file   Stress:     Feeling of Stress : Not on file   Social Connections:     Frequency of Communication with Friends and Family: Not on file    Frequency of Social Gatherings with Friends and Family: Not on file    Attends Restorationist Services: Not on file    Active Member of Clubs or Organizations: Not on file    Attends Club or Organization Meetings: Not on file    Marital Status: Not on file   Intimate Partner Violence:     Fear of Current or Ex-Partner: Not on file    Emotionally Abused: Not on file    Physically Abused: Not on file    Sexually Abused: Not on file   Housing Stability:     Unable to Pay for Housing in the Last Year: Not on file    Number of Places Lived in the Last Year: Not on file    Unstable Housing in the Last Year: Not on file        TOBACCO: Leydi  reports that she has never smoked. She has never used smokeless tobacco.  ETOH: Leydi  reports no history of alcohol use. Review of Systems     MSE:  Appearance: alert, cooperative, mild distress  Attention:Limited  Appetite: abnormal: fluctuates depending on mood  Ambulation: unable to assess. Sleep disturbance: Yes  Loss of pleasure: Yes  Speech: normal rate and normal volume  Mood: Anxious  Affect: anxiety  Thought Content: cognitive distortions  Insight: Fair  Judgment: Impaired  Memory: Intact long-term and Intact short-term  Suicide Assessment: no suicidal ideation  Homicide Assessment: denies current homicidal ideation, plan and intent    Diagnostic Screening:   PHQ Scores 6/20/2022 5/27/2022 2/22/2022 9/30/2021 9/9/2021 8/19/2021 4/12/2021   PHQ2 Score 3 2 6 4 2 6 2   PHQ9 Score 14 12 16 15 12 12 8     Interpretation of Total Score Depression Severity: 1-4 = Minimal depression, 5-9 = Mild depression, 10-14 = Moderate depression, 15-19 = Moderately severe depression, 20-27 = Severe depression     ELOISA 7 SCORE 6/20/2022 5/27/2022 2/22/2022 9/30/2021 9/9/2021 8/19/2021 2/18/2021   ELOISA-7 Total Score 11 15 7 11 13 17 -   ELOISA-7 Total Score - - - - - - 15     Interpretation of ELOISA-7 score: 5-9 = mild anxiety, 10-14 = moderate anxiety, 15+ = severe anxiety. Recommend referral to behavioral health for scores 10 or greater. PDMP Monitoring:    Last PDMP José Luis as Reviewed [facility]):  Review User Review Instant Review Result            Urine Drug Screenings (1 yr)    No resulted procedures found. Medication Contract and Consent for Opioid Use Documents Filed        No documents found                     Last Labs: No results found for: LABA1C  No results found for: EAG   Lab Results   Component Value Date    WBC 6.2 12/21/2020    HGB 12.4 12/21/2020    HCT 37.8 12/21/2020    MCV 85.5 12/21/2020     12/21/2020    LYMPHOPCT 27.8 12/21/2020    RBC 4.42 12/21/2020    MCH 28.1 12/21/2020    MCHC 32.8 12/21/2020    RDW 15.2 12/21/2020          Lab Results   Component Value Date     12/21/2020    K 4.3 12/21/2020     12/21/2020    CO2 26 12/21/2020    BUN 14 12/21/2020    CREATININE 0.7 12/21/2020    GLUCOSE 80 12/21/2020    CALCIUM 9.6 12/21/2020    PROT 7.4 12/21/2020    LABALBU 4.5 12/21/2020    BILITOT <0.2 12/21/2020    ALKPHOS 66 12/21/2020    AST 17 12/21/2020    ALT 12 12/21/2020    LABGLOM >60 12/21/2020    GFRAA >60 12/21/2020    AGRATIO 1.6 12/21/2020    GLOB 2.9 12/21/2020       Assessment/Plan    1. Dysthymia  Will increase Pristiq to 50 mg daily to target symptoms of anxiety and depression. Patient to call if any concerns or SI before their follow up appointment. If she develops suicidal thoughts with a plan, she is instructed to go to emergency room immediately. Behavioral counseling as scheduled. - desvenlafaxine succinate (PRISTIQ) 50 MG TB24 extended release tablet; Take 1 tablet by mouth daily  Dispense: 30 tablet; Refill: 0    2. ELOISA (generalized anxiety disorder)  - desvenlafaxine succinate (PRISTIQ) 50 MG TB24 extended release tablet; Take 1 tablet by mouth daily  Dispense: 30 tablet; Refill: 0    3. MTHFR gene mutation  - methylfolate (DEPLIN) 7.5 MG TABS tablet; Take 1 tablet by mouth daily  Dispense: 30 tablet;  Refill: 6     The [facility] is a 24/7 emotional support call service created by the [address] [place] [address]. The line offers free, confidential support in times of personal crisis when individuals may be struggling to cope with current challenges in their lives. Crisis Text Line, text the keyword 4hope to 0499 13 05 85 to be connected to a trained Crisis Counselor within 5 minutes. National Suicide Prevention Lifeline Number (058-633-4247), which provides 24/7, free, and confidential support. Araujo [place], was evaluated through a synchronous (real-time) audio-video encounter. The patient (or guardian if applicable) is aware that this is a billable service, which includes applicable co-pays. This Virtual Visit was conducted with patient's (and/or legal guardian's) consent. The visit was conducted pursuant to the emergency declaration under the [address] [place] [address], [address] [place] [address] authority and the [employer] General Act. Patient identification was verified, and a caregiver was present when appropriate. The patient was located at Home: [place] [address]. Provider was located at [facility] (Appt Dept): [facility] [address] on 7/15/2022 at 1:26 PM    An electronic signature was used to authenticate this note.

## 2022-07-21 DIAGNOSIS — F41.1 GAD (GENERALIZED ANXIETY DISORDER): Primary | ICD-10-CM

## 2022-07-21 RX ORDER — DESVENLAFAXINE 25 MG/1
TABLET, EXTENDED RELEASE ORAL
Qty: 30 TABLET | Refills: 0 | OUTPATIENT
Start: 2022-07-21

## 2022-07-21 RX ORDER — HYDROXYZINE HYDROCHLORIDE 25 MG/1
25 TABLET, FILM COATED ORAL 3 TIMES DAILY PRN
Qty: 90 TABLET | Refills: 1 | Status: SHIPPED | OUTPATIENT
Start: 2022-07-21 | End: 2022-08-20

## 2022-08-04 DIAGNOSIS — F41.1 GAD (GENERALIZED ANXIETY DISORDER): Primary | ICD-10-CM

## 2022-08-04 DIAGNOSIS — F34.1 DYSTHYMIA: ICD-10-CM

## 2022-08-04 RX ORDER — DESVENLAFAXINE 100 MG/1
100 TABLET, EXTENDED RELEASE ORAL DAILY
Qty: 30 TABLET | Refills: 1 | Status: SHIPPED | OUTPATIENT
Start: 2022-08-04 | End: 2022-09-13 | Stop reason: SDUPTHER

## 2022-08-08 DIAGNOSIS — K21.9 GASTROESOPHAGEAL REFLUX DISEASE, UNSPECIFIED WHETHER ESOPHAGITIS PRESENT: ICD-10-CM

## 2022-08-15 RX ORDER — PANTOPRAZOLE SODIUM 20 MG/1
TABLET, DELAYED RELEASE ORAL
Qty: 90 TABLET | Refills: 1 | Status: SHIPPED | OUTPATIENT
Start: 2022-08-15

## 2022-09-13 ENCOUNTER — TELEMEDICINE (OUTPATIENT)
Dept: FAMILY MEDICINE CLINIC | Age: 22
End: 2022-09-13
Payer: COMMERCIAL

## 2022-09-13 DIAGNOSIS — F41.1 GAD (GENERALIZED ANXIETY DISORDER): Primary | ICD-10-CM

## 2022-09-13 DIAGNOSIS — F34.1 DYSTHYMIA: ICD-10-CM

## 2022-09-13 PROCEDURE — G8427 DOCREV CUR MEDS BY ELIG CLIN: HCPCS | Performed by: NURSE PRACTITIONER

## 2022-09-13 PROCEDURE — 99214 OFFICE O/P EST MOD 30 MIN: CPT | Performed by: NURSE PRACTITIONER

## 2022-09-13 RX ORDER — DESVENLAFAXINE 25 MG/1
25 TABLET, EXTENDED RELEASE ORAL DAILY
Qty: 30 TABLET | Refills: 1 | Status: SHIPPED | OUTPATIENT
Start: 2022-09-13 | End: 2022-10-25 | Stop reason: SDUPTHER

## 2022-09-13 RX ORDER — DESVENLAFAXINE 100 MG/1
100 TABLET, EXTENDED RELEASE ORAL DAILY
Qty: 30 TABLET | Refills: 1 | Status: SHIPPED | OUTPATIENT
Start: 2022-09-13 | End: 2022-10-25 | Stop reason: SDUPTHER

## 2022-09-13 RX ORDER — HYDROXYZINE HYDROCHLORIDE 25 MG/1
25 TABLET, FILM COATED ORAL 3 TIMES DAILY PRN
COMMUNITY
End: 2022-10-25 | Stop reason: DRUGHIGH

## 2022-09-13 ASSESSMENT — PATIENT HEALTH QUESTIONNAIRE - PHQ9
5. POOR APPETITE OR OVEREATING: 2
2. FEELING DOWN, DEPRESSED OR HOPELESS: 1
3. TROUBLE FALLING OR STAYING ASLEEP: 3
SUM OF ALL RESPONSES TO PHQ QUESTIONS 1-9: 14
9. THOUGHTS THAT YOU WOULD BE BETTER OFF DEAD, OR OF HURTING YOURSELF: 0
1. LITTLE INTEREST OR PLEASURE IN DOING THINGS: 2
8. MOVING OR SPEAKING SO SLOWLY THAT OTHER PEOPLE COULD HAVE NOTICED. OR THE OPPOSITE, BEING SO FIGETY OR RESTLESS THAT YOU HAVE BEEN MOVING AROUND A LOT MORE THAN USUAL: 1
SUM OF ALL RESPONSES TO PHQ QUESTIONS 1-9: 14
7. TROUBLE CONCENTRATING ON THINGS, SUCH AS READING THE NEWSPAPER OR WATCHING TELEVISION: 1
SUM OF ALL RESPONSES TO PHQ QUESTIONS 1-9: 14
10. IF YOU CHECKED OFF ANY PROBLEMS, HOW DIFFICULT HAVE THESE PROBLEMS MADE IT FOR YOU TO DO YOUR WORK, TAKE CARE OF THINGS AT HOME, OR GET ALONG WITH OTHER PEOPLE: 1
4. FEELING TIRED OR HAVING LITTLE ENERGY: 3
SUM OF ALL RESPONSES TO PHQ QUESTIONS 1-9: 14
6. FEELING BAD ABOUT YOURSELF - OR THAT YOU ARE A FAILURE OR HAVE LET YOURSELF OR YOUR FAMILY DOWN: 1
SUM OF ALL RESPONSES TO PHQ9 QUESTIONS 1 & 2: 3

## 2022-09-13 ASSESSMENT — ENCOUNTER SYMPTOMS
SINUS PAIN: 0
RHINORRHEA: 0
WHEEZING: 0
SORE THROAT: 0
SHORTNESS OF BREATH: 0
SINUS PRESSURE: 0

## 2022-09-13 ASSESSMENT — ANXIETY QUESTIONNAIRES
4. TROUBLE RELAXING: 2
3. WORRYING TOO MUCH ABOUT DIFFERENT THINGS: 2
GAD7 TOTAL SCORE: 12
IF YOU CHECKED OFF ANY PROBLEMS ON THIS QUESTIONNAIRE, HOW DIFFICULT HAVE THESE PROBLEMS MADE IT FOR YOU TO DO YOUR WORK, TAKE CARE OF THINGS AT HOME, OR GET ALONG WITH OTHER PEOPLE: SOMEWHAT DIFFICULT
2. NOT BEING ABLE TO STOP OR CONTROL WORRYING: 1
5. BEING SO RESTLESS THAT IT IS HARD TO SIT STILL: 0
6. BECOMING EASILY ANNOYED OR IRRITABLE: 3
1. FEELING NERVOUS, ANXIOUS, OR ON EDGE: 2
7. FEELING AFRAID AS IF SOMETHING AWFUL MIGHT HAPPEN: 2

## 2022-09-13 NOTE — PROGRESS NOTES
2022    TELEHEALTH EVALUATION -- Audio/Visual (During DPNXU-19 public health emergency)    HPI:    Coty Reza (:  2000) has requested an audio/video evaluation for the following concern(s): Anxiety/Depression  She is currently prescribed the following: Pristiq 100 mg daily for anxiety/depression, Deplin 7.5 mg daily for MTHFR mutation and hydroxyzine 25 mg at HS. She continues to endorse the following depressive symptoms: feelings of being down, depressed or hopelessness, loss of interest in usual activities, flucutationg appetite, feelings of guilt, worthlessness or loss of self confidence, problems concentrating, fatigue and sleep difficulties. The following anxiety symptoms are present: excessive worry and agitation. She has noted an improvement in her overall symptoms of anxiety and depression since starting Pristiq. She states that her therapist is happy with her progress and she continues to participate in therapy several times a month. She denies visual  and auditory hallucinations, delusions, illusions and paranoia. Pt denies current suicidal ideation, plan and intent. Pt  denies current homicidal ideation, plan and intent. Historically she has been prescribed the following medications: Wellbutrin - not effective (April-2020)   Buspar - dizziness  Lexapro - (2019 - present). She reports that it works well for her anxiety but has done nothing for her depression. Amitriptyline - for stomach pain. Medication was discontinue in  2019 because prescribing provider stated that increased dose wouldn't effectively treat her stomach pain. She notes that when taking Elavil her depression was well controlled and she did not have any difficulties with sleep. Melatonin 10 mg at HS    Review of Systems   Constitutional:  Positive for fatigue. Negative for activity change, appetite change, chills, diaphoresis, fever and unexpected weight change.    HENT:  Negative for congestion, ear discharge, ear pain, hearing loss, postnasal drip, rhinorrhea, sinus pressure, sinus pain, sneezing, sore throat and tinnitus. Respiratory:  Negative for shortness of breath and wheezing. Cardiovascular:  Negative for chest pain and palpitations. Gastrointestinal:         Hx of GERD; symptoms are well controlled   Skin:  Negative for rash. Neurological:  Negative for dizziness, tremors, weakness and headaches. Psychiatric/Behavioral:  Positive for dysphoric mood and sleep disturbance. Negative for agitation, behavioral problems, confusion, decreased concentration, hallucinations, self-injury and suicidal ideas. The patient is nervous/anxious. The patient is not hyperactive. Symptoms are improving       Prior to Visit Medications    Medication Sig Taking?  Authorizing Provider   hydrOXYzine HCl (ATARAX) 25 MG tablet Take 25 mg by mouth 3 times daily as needed Yes Historical Provider, MD   desvenlafaxine succinate (PRISTIQ) 25 MG TB24 extended release tablet Take 1 tablet by mouth daily Yes AZ Calles NP   desvenlafaxine succinate (PRISTIQ) 100 MG TB24 extended release tablet Take 1 tablet by mouth daily Yes AZ Calles NP   pantoprazole (PROTONIX) 20 MG tablet take 1 tablet by mouth once daily before breakfast Yes AZ Sanabria NP   methylfolate (DEPLIN) 7.5 MG TABS tablet Take 1 tablet by mouth daily Yes AZ Calles NP   ondansetron (ZOFRAN-ODT) 4 MG disintegrating tablet Take 1 tablet by mouth every 12 hours as needed for Nausea Yes AZ Sanabria NP   JONI 0.25-35 MG-MCG per tablet TAKE 1 TABLET BY MOUTH EVERY DAY Yes AZ Sanabria NP   sucralfate (CARAFATE) 1 GM/10ML suspension Take 10 mLs by mouth 4 times daily Yes AZ Sanabria NP       Social History     Tobacco Use    Smoking status: Never    Smokeless tobacco: Never   Vaping Use    Vaping Use: Never used   Substance Use Topics    Alcohol use: Never    Drug use: Never        Allergies   Allergen Reactions    Pomegranate (Punica Granatum)    ,   Past Medical History:   Diagnosis Date    Anxiety     Heart murmur     As an infant       PHYSICAL EXAMINATION:  [ INSTRUCTIONS:  \"[x]\" Indicates a positive item  \"[]\" Indicates a negative item  -- DELETE ALL ITEMS NOT EXAMINED]  Vital Signs: Pt unable to obtain VS    Constitutional: [x] Appears well-developed and well-nourished [x] No apparent distress      [] Abnormal-   Mental status  [x] Alert and awake  [x] Oriented to person/place/time [x]Able to follow commands      Eyes:  EOM    [x]  Normal  [] Abnormal-  Sclera  [x]  Normal  [] Abnormal -         Discharge []  None visible  [] Abnormal -    HENT:   [x] Normocephalic, atraumatic. [] Abnormal   [x] Mouth/Throat: Mucous membranes are moist.     External Ears [x] Normal  [] Abnormal-     Neck: [x] No visualized mass     Pulmonary/Chest: [x] Respiratory effort normal.  [x] No visualized signs of difficulty breathing or respiratory distress        [] Abnormal-      Musculoskeletal:   [] Normal gait with no signs of ataxia         [x] Normal range of motion of neck        [] Abnormal-       Neurological:        [] No Facial Asymmetry (Cranial nerve 7 motor function) (limited exam to video visit)          [x] No gaze palsy        [] Abnormal-         Skin:        [x] No significant exanthematous lesions or discoloration noted on facial skin         [] Abnormal-            Psychiatric:       [x] Normal Affect [x] No Hallucinations        [] Abnormal-       ASSESSMENT/PLAN:  1. ELOISA (generalized anxiety disorder)  Will increase Pristiq to 125 mg daily to target symptoms of anxiety and depression. Continue counseling as scheduled. - desvenlafaxine succinate (PRISTIQ) 25 MG TB24 extended release tablet; Take 1 tablet by mouth daily  Dispense: 30 tablet; Refill: 1  - desvenlafaxine succinate (PRISTIQ) 100 MG TB24 extended release tablet;  Take 1 tablet by mouth daily  Dispense: 30 tablet; Refill: 1    2. Dysthymia  Will increase Pristiq to 125 mg daily to target symptoms of anxiety and depression. Patient to call if any concerns or SI before their follow up appointment. If she develops suicidal thoughts with a plan, she is instructed to go to emergency room immediately. Continue counseling as scheduled. - desvenlafaxine succinate (PRISTIQ) 25 MG TB24 extended release tablet; Take 1 tablet by mouth daily  Dispense: 30 tablet; Refill: 1  - desvenlafaxine succinate (PRISTIQ) 100 MG TB24 extended release tablet; Take 1 tablet by mouth daily  Dispense: 30 tablet; Refill: 1    Return in about 6 weeks (around 10/25/2022) for Anxiety, Depression. Meryl Guerrier, was evaluated through a synchronous (real-time) audio-video encounter. The patient (or guardian if applicable) is aware that this is a billable service, which includes applicable co-pays. This Virtual Visit was conducted with patient's (and/or legal guardian's) consent. The visit was conducted pursuant to the emergency declaration under the [address] [place] [address], [address] [place] [address] authority and the [employer] General Act. Patient identification was verified, and a caregiver was present when appropriate. The patient was located at Other: work . Provider was located at [facility] (Appt Dept): [facility],  [address] [place] [address]. Total time spent on this encounter:  30 minutes    --AZ Garcia NP on 9/13/2022 at 10:53 AM    An electronic signature was used to authenticate this note.

## 2022-10-25 ENCOUNTER — TELEMEDICINE (OUTPATIENT)
Dept: FAMILY MEDICINE CLINIC | Age: 22
End: 2022-10-25
Payer: COMMERCIAL

## 2022-10-25 DIAGNOSIS — F34.1 DYSTHYMIA: ICD-10-CM

## 2022-10-25 DIAGNOSIS — F41.1 GAD (GENERALIZED ANXIETY DISORDER): Primary | ICD-10-CM

## 2022-10-25 PROCEDURE — 99214 OFFICE O/P EST MOD 30 MIN: CPT | Performed by: NURSE PRACTITIONER

## 2022-10-25 PROCEDURE — G8428 CUR MEDS NOT DOCUMENT: HCPCS | Performed by: NURSE PRACTITIONER

## 2022-10-25 RX ORDER — DESVENLAFAXINE 100 MG/1
100 TABLET, EXTENDED RELEASE ORAL DAILY
Qty: 30 TABLET | Refills: 1 | Status: SHIPPED | OUTPATIENT
Start: 2022-10-25 | End: 2022-11-24

## 2022-10-25 RX ORDER — DESVENLAFAXINE 25 MG/1
25 TABLET, EXTENDED RELEASE ORAL DAILY
Qty: 30 TABLET | Refills: 1 | Status: SHIPPED | OUTPATIENT
Start: 2022-10-25 | End: 2022-11-24

## 2022-10-25 RX ORDER — HYDROXYZINE HYDROCHLORIDE 25 MG/1
25 TABLET, FILM COATED ORAL NIGHTLY
Status: SHIPPED | COMMUNITY
Start: 2022-10-25

## 2022-10-25 ASSESSMENT — PATIENT HEALTH QUESTIONNAIRE - PHQ9
1. LITTLE INTEREST OR PLEASURE IN DOING THINGS: 2
SUM OF ALL RESPONSES TO PHQ QUESTIONS 1-9: 16
7. TROUBLE CONCENTRATING ON THINGS, SUCH AS READING THE NEWSPAPER OR WATCHING TELEVISION: 1
2. FEELING DOWN, DEPRESSED OR HOPELESS: 2
4. FEELING TIRED OR HAVING LITTLE ENERGY: 3
6. FEELING BAD ABOUT YOURSELF - OR THAT YOU ARE A FAILURE OR HAVE LET YOURSELF OR YOUR FAMILY DOWN: 1
SUM OF ALL RESPONSES TO PHQ9 QUESTIONS 1 & 2: 4
10. IF YOU CHECKED OFF ANY PROBLEMS, HOW DIFFICULT HAVE THESE PROBLEMS MADE IT FOR YOU TO DO YOUR WORK, TAKE CARE OF THINGS AT HOME, OR GET ALONG WITH OTHER PEOPLE: 2
5. POOR APPETITE OR OVEREATING: 1
SUM OF ALL RESPONSES TO PHQ QUESTIONS 1-9: 16
SUM OF ALL RESPONSES TO PHQ QUESTIONS 1-9: 16
9. THOUGHTS THAT YOU WOULD BE BETTER OFF DEAD, OR OF HURTING YOURSELF: 0
8. MOVING OR SPEAKING SO SLOWLY THAT OTHER PEOPLE COULD HAVE NOTICED. OR THE OPPOSITE, BEING SO FIGETY OR RESTLESS THAT YOU HAVE BEEN MOVING AROUND A LOT MORE THAN USUAL: 3
SUM OF ALL RESPONSES TO PHQ QUESTIONS 1-9: 16
3. TROUBLE FALLING OR STAYING ASLEEP: 3

## 2022-10-25 ASSESSMENT — ANXIETY QUESTIONNAIRES
GAD7 TOTAL SCORE: 10
IF YOU CHECKED OFF ANY PROBLEMS ON THIS QUESTIONNAIRE, HOW DIFFICULT HAVE THESE PROBLEMS MADE IT FOR YOU TO DO YOUR WORK, TAKE CARE OF THINGS AT HOME, OR GET ALONG WITH OTHER PEOPLE: SOMEWHAT DIFFICULT
6. BECOMING EASILY ANNOYED OR IRRITABLE: 3
7. FEELING AFRAID AS IF SOMETHING AWFUL MIGHT HAPPEN: 1
1. FEELING NERVOUS, ANXIOUS, OR ON EDGE: 1
3. WORRYING TOO MUCH ABOUT DIFFERENT THINGS: 2
5. BEING SO RESTLESS THAT IT IS HARD TO SIT STILL: 0
4. TROUBLE RELAXING: 1
2. NOT BEING ABLE TO STOP OR CONTROL WORRYING: 2

## 2022-10-25 NOTE — PROGRESS NOTES
10/25/2022    TELEHEALTH EVALUATION -- Audio/Visual (During [facility]- public health emergency)    HPI:    Noah Murphy (:  2000) has requested an audio/video evaluation for the following concern(s): Anxiety/Depression  She is currently prescribed the following: Pristiq 125 mg daily for anxiety/depression, Deplin 7.5 mg daily for MTHFR mutation and hydroxyzine 25 mg at HS. She continues to endorse the following depressive symptoms: feelings of being down, depressed or hopelessness, loss of interest in usual activities, flucutationg appetite, feelings of guilt, worthlessness or loss of self confidence, problems concentrating, chronic daytime fatigue and difficulty staying asleep at night. . The following anxiety symptoms are present: excessive worry and agitation. 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 She has several new health concerns. Her family has a hx of IBS and she is concerned that she is having symptoms. Her health screening at work showed that she had high cholesterol. She is establishing with PCP in [facility] on . Recommended that she discuss additional blood work including thyroid panel and possibly a referral to sleep medicine for evaluation of possible sleep disorder. She denies visual  and auditory hallucinations, delusions, illusions and paranoia. Pt denies current suicidal ideation, plan and intent. Pt  denies current homicidal ideation, plan and intent. Historically she has been prescribed the following medications: Wellbutrin - not effective (April-2020)   Buspar - dizziness  Lexapro - (2019 - present). She reports that it works well for her anxiety but has done nothing for her depression. Amitriptyline - for stomach pain. Medication was discontinue in  2019 because prescribing provider stated that increased dose wouldn't effectively treat her stomach pain.   She notes that when taking Elavil her depression was well controlled and she did not have any difficulties with sleep. Melatonin 10 mg at     Review of Systems    Prior to Visit Medications    Medication Sig Taking? Authorizing Provider   hydrOXYzine HCl (ATARAX) 25 MG tablet Take 1 tablet by mouth at bedtime Yes AZ Phelps - NP   desvenlafaxine succinate (PRISTIQ) 100 MG TB24 extended release tablet Take 1 tablet by mouth daily Yes AZ Phelps - NP   desvenlafaxine succinate (PRISTIQ) 25 MG TB24 extended release tablet Take 1 tablet by mouth daily Yes AZ Phelps - NP   pantoprazole (PROTONIX) 20 MG tablet take 1 tablet by mouth once daily before breakfast Yes Nonahun Veraank, [place] - NP   methylfolate (DEPLIN) 7.5 MG TABS tablet Take 1 tablet by mouth daily Yes No Waller JodyankAZ NP   JONI 0.25-35 MG-MCG per tablet TAKE 1 TABLET BY MOUTH EVERY DAY Yes AZ Cameron NP   ondansetron (ZOFRAN-ODT) 4 MG disintegrating tablet Take 1 tablet by mouth every 12 hours as needed for Nausea  AZ Dale NP   sucralfate (CARAFATE) 1 GM/10ML suspension Take 10 mLs by mouth 4 times daily  AZ Dale NP       Social History     Tobacco Use    Smoking status: Never    Smokeless tobacco: Never   Vaping Use    Vaping Use: Never used   Substance Use Topics    Alcohol use: Never    Drug use: Never        Allergies   Allergen Reactions    Pomegranate (Punica Granatum)    ,   Past Medical History:   Diagnosis Date    Anxiety     Heart murmur     As an infant       MSE:  Appearance: alert, cooperative, mild distress  Attention:Intact  Appetite: abnormal: fluctuates depending on mood  Ambulation: unable to assess.    Sleep disturbance: Yes  Loss of pleasure: Yes  Speech: normal rate and normal volume  Mood: Depressed  Low self-esteem  Irritability  Affect: depressed affect  Thought Content: hopelessness, cognitive distortions, and all or nothing thinking  Insight: Fair  Judgment: Intact  Memory: Intact long-term and Intact short-term  Suicide Assessment: no suicidal ideation  Homicide Assessment: denies current homicidal ideation, plan and intent     ASSESSMENT/PLAN:  1. ELOISA (generalized anxiety disorder)  Continue current medications at this time until patient has established with new PCP. Continue behavioral counseling at this time. - desvenlafaxine succinate (PRISTIQ) 100 MG TB24 extended release tablet; Take 1 tablet by mouth daily  Dispense: 30 tablet; Refill: 1  - desvenlafaxine succinate (PRISTIQ) 25 MG TB24 extended release tablet; Take 1 tablet by mouth daily  Dispense: 30 tablet; Refill: 1    2. Dysthymia  Stable. Continue medication. Patient to call if any concerns or SI before their follow up appointment. If he develops suicidal thoughts with a plan, he is instructed to go to emergency room immediately. Behavioral counseling recommended. - desvenlafaxine succinate (PRISTIQ) 100 MG TB24 extended release tablet; Take 1 tablet by mouth daily  Dispense: 30 tablet; Refill: 1  - desvenlafaxine succinate (PRISTIQ) 25 MG TB24 extended release tablet; Take 1 tablet by mouth daily  Dispense: 30 tablet; Refill: 1    No follow-ups on file. Michael Garcia, was evaluated through a synchronous (real-time) audio-video encounter. The patient (or guardian if applicable) is aware that this is a billable service, which includes applicable co-pays. This Virtual Visit was conducted with patient's (and/or legal guardian's) consent. The visit was conducted pursuant to the emergency declaration under the [facility], [address] [place] [address] authority and the [employer] General Act. Patient identification was verified, and a caregiver was present when appropriate. The patient was located at Home: [place] [address].    Provider was located at [facility] ([address] [place] [address]): [address] [facility]  [address]. [place],  [facility] [address] [place] [address] Total time spent on this encounter:  30 minutes    --AZ Pryor - NP on 10/27/2022 at 6:16 AM    An electronic signature was used to authenticate this note.

## 2022-11-22 ENCOUNTER — TELEMEDICINE (OUTPATIENT)
Dept: FAMILY MEDICINE CLINIC | Age: 22
End: 2022-11-22
Payer: COMMERCIAL

## 2022-11-22 DIAGNOSIS — F34.1 DYSTHYMIA: Primary | ICD-10-CM

## 2022-11-22 DIAGNOSIS — F41.1 GAD (GENERALIZED ANXIETY DISORDER): ICD-10-CM

## 2022-11-22 PROBLEM — E78.5 HYPERLIPIDEMIA, UNSPECIFIED: Status: ACTIVE | Noted: 2022-11-17

## 2022-11-22 PROBLEM — E55.9 VITAMIN D DEFICIENCY: Status: ACTIVE | Noted: 2022-11-22

## 2022-11-22 PROCEDURE — G8427 DOCREV CUR MEDS BY ELIG CLIN: HCPCS | Performed by: NURSE PRACTITIONER

## 2022-11-22 PROCEDURE — 99214 OFFICE O/P EST MOD 30 MIN: CPT | Performed by: NURSE PRACTITIONER

## 2022-11-22 RX ORDER — ERGOCALCIFEROL 1.25 MG/1
50000 CAPSULE ORAL WEEKLY
COMMUNITY

## 2022-11-22 RX ORDER — IRON POLYSACCHARIDE COMPLEX 180 MG
1 CAPSULE ORAL DAILY
COMMUNITY
Start: 2022-11-11

## 2022-11-22 RX ORDER — ARIPIPRAZOLE 2 MG/1
2 TABLET ORAL DAILY
Qty: 30 TABLET | Refills: 0 | Status: SHIPPED | OUTPATIENT
Start: 2022-11-22

## 2022-11-22 ASSESSMENT — ENCOUNTER SYMPTOMS
WHEEZING: 0
SHORTNESS OF BREATH: 0

## 2022-11-22 NOTE — PROGRESS NOTES
2022    TELEHEALTH EVALUATION -- Audio/Visual (During [id number] public health emergency)    HPI:    Kaleb Puente (:  2000) has requested an audio/video evaluation for the following concern(s): Anxiety/Depression  She is currently prescribed the following: Pristiq 125 mg daily for anxiety/depression, Deplin 7.5 mg daily for MTHFR mutation and hydroxyzine 25 mg at HS. She continues to endorse the following depressive symptoms: feelings of being down, depressed or hopelessness, loss of interest in usual activities, flucutationg appetite, feelings of guilt, worthlessness or loss of self confidence, problems concentrating, chronic daytime fatigue and difficulty staying asleep at night. . The following anxiety symptoms are present: excessive worry and agitation. She established with a new PCP in [place] on . She had blood work done and A1C and thyroid function were normal.  She was diagnosed with vitamin d deficiency and b12 deficiency. She denies visual  and auditory hallucinations, delusions, illusions and paranoia. Pt denies current suicidal ideation, plan and intent. Pt  denies current homicidal ideation, plan and intent. Historically she has been prescribed the following medications: Wellbutrin - not effective (April-2020)   Buspar - dizziness  Lexapro - (2019 - present). She reports that it works well for her anxiety but has done nothing for her depression. Amitriptyline - for stomach pain. Medication was discontinue in  2019 because prescribing provider stated that increased dose wouldn't effectively treat her stomach pain. She notes that when taking Elavil her depression was well controlled and she did not have any difficulties with sleep. Melatonin 10 mg at     Review of Systems   Constitutional:  Positive for fatigue. Negative for activity change, appetite change, chills, diaphoresis, fever and unexpected weight change.    Respiratory: Negative for shortness of breath and wheezing. Cardiovascular:  Negative for chest pain and palpitations. Skin:  Negative for rash. Neurological:  Negative for dizziness, tremors, weakness and headaches. Psychiatric/Behavioral:  Positive for dysphoric mood and sleep disturbance. Negative for agitation, behavioral problems, confusion, decreased concentration, hallucinations, self-injury and suicidal ideas. The patient is nervous/anxious. The patient is not hyperactive. Symptoms are improving       Prior to Visit Medications    Medication Sig Taking?  Authorizing Provider   vitamin D (ERGOCALCIFEROL) 1.25 MG (75028 UT) CAPS capsule Take 50,000 Units by mouth once a week Yes Historical Provider, MD SHARP 391.3 (180 Fe) MG CAPS Take 1 capsule by mouth daily Yes Historical Provider, MD   ARIPiprazole (ABILIFY) 2 MG tablet Take 1 tablet by mouth daily Yes Madolyn Sandhoff, APRN - NP   hydrOXYzine HCl (ATARAX) 25 MG tablet Take 1 tablet by mouth at bedtime Yes Madolyn Sandhoff, APRN - NP   desvenlafaxine succinate (PRISTIQ) 100 MG TB24 extended release tablet Take 1 tablet by mouth daily Yes Madolyn Sandhoff, APRN - NP   desvenlafaxine succinate (PRISTIQ) 25 MG TB24 extended release tablet Take 1 tablet by mouth daily Yes Madolyn Sandhoff, APRN - NP   pantoprazole (PROTONIX) 20 MG tablet take 1 tablet by mouth once daily before breakfast Yes AZ Tanner NP   methylfolate (DEPLIN) 7.5 MG TABS tablet Take 1 tablet by mouth daily Yes Madolyn Sandhoff, APRN - NP   ondansetron (ZOFRAN-ODT) 4 MG disintegrating tablet Take 1 tablet by mouth every 12 hours as needed for Nausea Yes AZ Tanner NP   JONI 0.25-35 MG-MCG per tablet TAKE 1 TABLET BY MOUTH EVERY DAY Yes AZ Tanner NP   sucralfate (CARAFATE) 1 GM/10ML suspension Take 10 mLs by mouth 4 times daily Yes AZ Tanner NP       Social History     Tobacco Use    Smoking status: Never    Smokeless tobacco: Never Vaping Use    Vaping Use: Never used   Substance Use Topics    Alcohol use: Never    Drug use: Never        Allergies   Allergen Reactions    Pomegranate (Punica Granatum)    ,   Past Medical History:   Diagnosis Date    Anxiety     Heart murmur     As an infant   , No past surgical history on file. PHYSICAL EXAMINATION:  [ INSTRUCTIONS:  \"[x]\" Indicates a positive item  \"[]\" Indicates a negative item  -- DELETE ALL ITEMS NOT EXAMINED]  Vital Signs: pt unable to obtain VS    MSE:  Appearance: alert, cooperative, no distress  Attention:Intact  Appetite: abnormal:  Ambulation: unable to assess. Sleep disturbance: Yes  Loss of pleasure: Yes  Speech: normal rate and normal volume  Mood: Anxious  Depressed  Affect: depressed affect  Thought Content: cognitive distortions  Insight: Fair  Judgment: Intact  Memory: Intact long-term and Intact short-term  Suicide Assessment: no suicidal ideation  Homicide Assessment: denies current homicidal ideation, plan and intent     ASSESSMENT/PLAN:  1. Dysthymia  Continue Pristiq 125 mg daily for anxiety and depression. Will start Abilify 2 mg daily to target symptoms of dysthymia. Patient to call if any concerns or SI before their follow up appointment. If she develops suicidal thoughts with a plan, she is instructed to go to emergency room immediately. Behavioral counseling as scheduled. - ARIPiprazole (ABILIFY) 2 MG tablet; Take 1 tablet by mouth daily  Dispense: 30 tablet; Refill: 0    2. ELOISA (generalized anxiety disorder)  Continue Pristiq 125 mg daily for anxiety and depression. Behavioral counseling as scheduled. Return in about 3 weeks (around 12/13/2022). Candelaria Vieyra, was evaluated through a synchronous (real-time) audio-video encounter. The patient (or guardian if applicable) is aware that this is a billable service, which includes applicable co-pays. This Virtual Visit was conducted with patient's (and/or legal guardian's) consent.  The visit was conducted pursuant to the emergency declaration under the [facility], [facility] authority and the [employer] and [employer] General Act. Patient identification was verified, and a caregiver was present when appropriate. The patient was located at Home: [address] [place] [address]. Provider was located at [facility] (Appt Dept): [address]. Total time spent on this encounter:  30 minutes    --AZ Davidson NP on 11/22/2022 at 4:54 PM    An electronic signature was used to authenticate this note.

## 2022-12-01 DIAGNOSIS — F34.1 DYSTHYMIA: ICD-10-CM

## 2022-12-01 DIAGNOSIS — F41.1 GAD (GENERALIZED ANXIETY DISORDER): ICD-10-CM

## 2022-12-02 RX ORDER — DESVENLAFAXINE 100 MG/1
TABLET, EXTENDED RELEASE ORAL
Qty: 30 TABLET | Refills: 1 | Status: SHIPPED | OUTPATIENT
Start: 2022-12-02

## 2022-12-15 ENCOUNTER — TELEMEDICINE (OUTPATIENT)
Dept: FAMILY MEDICINE CLINIC | Age: 22
End: 2022-12-15
Payer: COMMERCIAL

## 2022-12-15 DIAGNOSIS — G47.00 INSOMNIA, UNSPECIFIED TYPE: ICD-10-CM

## 2022-12-15 DIAGNOSIS — F41.1 GAD (GENERALIZED ANXIETY DISORDER): ICD-10-CM

## 2022-12-15 DIAGNOSIS — F34.1 DYSTHYMIA: Primary | ICD-10-CM

## 2022-12-15 PROCEDURE — G8427 DOCREV CUR MEDS BY ELIG CLIN: HCPCS | Performed by: NURSE PRACTITIONER

## 2022-12-15 PROCEDURE — 99214 OFFICE O/P EST MOD 30 MIN: CPT | Performed by: NURSE PRACTITIONER

## 2022-12-15 RX ORDER — ARIPIPRAZOLE 2 MG/1
2 TABLET ORAL DAILY
Qty: 30 TABLET | Refills: 1 | Status: SHIPPED | OUTPATIENT
Start: 2022-12-15

## 2022-12-15 RX ORDER — DESVENLAFAXINE 25 MG/1
25 TABLET, EXTENDED RELEASE ORAL DAILY
Qty: 30 TABLET | Refills: 1 | Status: SHIPPED | OUTPATIENT
Start: 2022-12-15 | End: 2023-01-14

## 2022-12-15 RX ORDER — CHOLECALCIFEROL (VITAMIN D3) 25 MCG
1 TABLET ORAL
Qty: 30 TABLET | Refills: 1 | Status: SHIPPED | OUTPATIENT
Start: 2022-12-15 | End: 2023-01-14

## 2022-12-15 RX ORDER — DESVENLAFAXINE 100 MG/1
100 TABLET, EXTENDED RELEASE ORAL DAILY
Qty: 30 TABLET | Refills: 1 | Status: SHIPPED | OUTPATIENT
Start: 2022-12-15 | End: 2023-01-14

## 2022-12-15 ASSESSMENT — ANXIETY QUESTIONNAIRES
2. NOT BEING ABLE TO STOP OR CONTROL WORRYING: 1
5. BEING SO RESTLESS THAT IT IS HARD TO SIT STILL: 0
3. WORRYING TOO MUCH ABOUT DIFFERENT THINGS: 2
7. FEELING AFRAID AS IF SOMETHING AWFUL MIGHT HAPPEN: 2
1. FEELING NERVOUS, ANXIOUS, OR ON EDGE: 1
IF YOU CHECKED OFF ANY PROBLEMS ON THIS QUESTIONNAIRE, HOW DIFFICULT HAVE THESE PROBLEMS MADE IT FOR YOU TO DO YOUR WORK, TAKE CARE OF THINGS AT HOME, OR GET ALONG WITH OTHER PEOPLE: SOMEWHAT DIFFICULT
6. BECOMING EASILY ANNOYED OR IRRITABLE: 3
4. TROUBLE RELAXING: 0
GAD7 TOTAL SCORE: 9

## 2022-12-15 ASSESSMENT — PATIENT HEALTH QUESTIONNAIRE - PHQ9
6. FEELING BAD ABOUT YOURSELF - OR THAT YOU ARE A FAILURE OR HAVE LET YOURSELF OR YOUR FAMILY DOWN: 1
9. THOUGHTS THAT YOU WOULD BE BETTER OFF DEAD, OR OF HURTING YOURSELF: 0
2. FEELING DOWN, DEPRESSED OR HOPELESS: 1
SUM OF ALL RESPONSES TO PHQ QUESTIONS 1-9: 10
1. LITTLE INTEREST OR PLEASURE IN DOING THINGS: 1
SUM OF ALL RESPONSES TO PHQ QUESTIONS 1-9: 10
3. TROUBLE FALLING OR STAYING ASLEEP: 3
7. TROUBLE CONCENTRATING ON THINGS, SUCH AS READING THE NEWSPAPER OR WATCHING TELEVISION: 0
5. POOR APPETITE OR OVEREATING: 1
10. IF YOU CHECKED OFF ANY PROBLEMS, HOW DIFFICULT HAVE THESE PROBLEMS MADE IT FOR YOU TO DO YOUR WORK, TAKE CARE OF THINGS AT HOME, OR GET ALONG WITH OTHER PEOPLE: 1
SUM OF ALL RESPONSES TO PHQ9 QUESTIONS 1 & 2: 2
4. FEELING TIRED OR HAVING LITTLE ENERGY: 2
8. MOVING OR SPEAKING SO SLOWLY THAT OTHER PEOPLE COULD HAVE NOTICED. OR THE OPPOSITE, BEING SO FIGETY OR RESTLESS THAT YOU HAVE BEEN MOVING AROUND A LOT MORE THAN USUAL: 1

## 2022-12-15 ASSESSMENT — ENCOUNTER SYMPTOMS
SHORTNESS OF BREATH: 0
WHEEZING: 0

## 2022-12-15 NOTE — PROGRESS NOTES
12/15/2022    TELEHEALTH EVALUATION -- Audio/Visual (During TKZ[facility]-03 public health emergency)    HPI:    Anne Dunham (:  2000) has requested an audio/video evaluation for the following concern(s): Anxiety/Depression  She is currently prescribed the following: Pristiq 125 mg daily for anxiety/depression, Abilify 2.5 mg daily, Deplin 7.5 mg daily for MTHFR mutation and hydroxyzine 25 mg at . She has also been taking Melatonin or [employer]ate[employer]'s Natural Rest to help her fall asleep but she continues to wake up frequently throughout the night. She notes an overall improvement in her mood. She continues to endorse the following depressive symptoms: feelings of being down, depressed or hopelessness, loss of interest in usual activities, flucutationg appetite, and feelings of guilt, worthlessness or loss of self confidence. The following anxiety symptoms are present: excessive worry and agitation. She denies visual  and auditory hallucinations, delusions, illusions and paranoia. Pt denies current suicidal ideation, plan and intent. Pt  denies current homicidal ideation, plan and intent. Historically she has been prescribed the following medications: Wellbutrin - not effective (April-2020)   Buspar - dizziness  Lexapro - (2019 - present). She reports that it works well for her anxiety but has done nothing for her depression. Amitriptyline - for stomach pain. Medication was discontinue in  2019 because prescribing provider stated that increased dose wouldn't effectively treat her stomach pain. She notes that when taking Elavil her depression was well controlled and she did not have any difficulties with sleep. Melatonin 10 mg at     Review of Systems   Constitutional:  Negative for activity change, appetite change, chills, diaphoresis, fatigue, fever and unexpected weight change. Respiratory:  Negative for shortness of breath and wheezing.     Cardiovascular:  Negative for chest pain and palpitations. Skin:  Negative for rash. Neurological:  Negative for dizziness, tremors, weakness and headaches. Psychiatric/Behavioral:  Positive for dysphoric mood and sleep disturbance. Negative for agitation, behavioral problems, confusion, decreased concentration, hallucinations, self-injury and suicidal ideas. The patient is nervous/anxious. The patient is not hyperactive. Symptoms are improving       Prior to Visit Medications    Medication Sig Taking?  Authorizing Provider   Melatonin CR 3 MG TBCR Take 1 tablet by mouth nightly Yes AZ Draper NP   ARIPiprazole (ABILIFY) 2 MG tablet Take 1 tablet by mouth daily Yes AZ Mckeon NP   desvenlafaxine succinate (PRISTIQ) 100 MG TB24 extended release tablet Take 1 tablet by mouth daily Yes AZ Mckeon NP   desvenlafaxine succinate (PRISTIQ) 25 MG TB24 extended release tablet Take 1 tablet by mouth daily Yes AZ Mckeon NP   vitamin D (ERGOCALCIFEROL) 1.25 MG (15126 UT) CAPS capsule Take 50,000 Units by mouth once a week Yes Historical Provider, MD SHARP 391.3 (180 Fe) MG CAPS Take 1 capsule by mouth daily Yes Historical Provider, MD   hydrOXYzine HCl (ATARAX) 25 MG tablet Take 1 tablet by mouth at bedtime Yes AZ Mckeon NP   pantoprazole (PROTONIX) 20 MG tablet take 1 tablet by mouth once daily before breakfast Yes AZ Singleton NP   methylfolate (DEPLIN) 7.5 MG TABS tablet Take 1 tablet by mouth daily Yes AZ Mckeon NP   ondansetron (ZOFRAN-ODT) 4 MG disintegrating tablet Take 1 tablet by mouth every 12 hours as needed for Nausea Yes AZ Draper NP   JONI 0.25-35 MG-MCG per tablet TAKE 1 TABLET BY MOUTH EVERY DAY Yes AZ Singleton NP   sucralfate (CARAFATE) 1 GM/10ML suspension Take 10 mLs by mouth 4 times daily Yes AZ Singleton NP       Social History     Tobacco Use    Smoking status: Never    Smokeless tobacco: Never Vaping Use    Vaping Use: Never used   Substance Use Topics    Alcohol use: Never    Drug use: Never        Allergies   Allergen Reactions    Pomegranate (Punica Granatum)    ,   Past Medical History:   Diagnosis Date    Anxiety     Heart murmur     As an infant   , No past surgical history on file. MSE:  Appearance: alert, cooperative, no distress  Attention:Intact  Appetite: fluctuates depending on mood  Ambulation: unable to assess. Sleep disturbance: Yes  Loss of pleasure: Yes, improved. She notes increased motivation  Speech: normal rate, normal volume, and well articulated  Mood: Euthymic  Affect: normal affect  Thought Content: cognitive distortions and all or nothing thinking  Insight: Fair  Judgment: Intact  Memory: Intact long-term and Intact short-term  Suicide Assessment: no suicidal ideation  Homicide Assessment: denies current homicidal ideation, plan and intent     ASSESSMENT/PLAN:  1. Dysthymia  Stable. Continue medication. Patient to call if any concerns or SI before their follow up appointment. If she develops suicidal thoughts with a plan, she is instructed to go to emergency room immediately. Behavioral counseling as scheduled. - ARIPiprazole (ABILIFY) 2 MG tablet; Take 1 tablet by mouth daily  Dispense: 30 tablet; Refill: 1  - desvenlafaxine succinate (PRISTIQ) 100 MG TB24 extended release tablet; Take 1 tablet by mouth daily  Dispense: 30 tablet; Refill: 1  - desvenlafaxine succinate (PRISTIQ) 25 MG TB24 extended release tablet; Take 1 tablet by mouth daily  Dispense: 30 tablet; Refill: 1    2. ELOISA (generalized anxiety disorder)  Stable, continue current medication. Behavioral counseling as scheduled. - desvenlafaxine succinate (PRISTIQ) 100 MG TB24 extended release tablet; Take 1 tablet by mouth daily  Dispense: 30 tablet; Refill: 1  - desvenlafaxine succinate (PRISTIQ) 25 MG TB24 extended release tablet; Take 1 tablet by mouth daily  Dispense: 30 tablet; Refill: 1    3. Insomnia, unspecified type  Will start Melatonin ER to help promote and maintain sleep. Sleep hygiene encouraged. Bed should only be used for sleep. Avoid watching TV, playing on your phone or tablet, or reading while in bed. Go to bed and wake up at the same time each day. Exercise regularly, preferably not before bedtime. Limit intake of caffeine, alcohol, and smoking.     - Melatonin CR 3 MG TBCR; Take 1 tablet by mouth nightly  Dispense: 30 tablet; Refill: 1    Return in about 6 weeks (around 1/26/2023). Aron Hagan, was evaluated through a synchronous (real-time) audio-video encounter. The patient (or guardian if applicable) is aware that this is a billable service, which includes applicable co-pays. This Virtual Visit was conducted with patient's (and/or legal guardian's) consent. The visit was conducted pursuant to the emergency declaration under the [facility][note 3]1 [facility], [address] [place] [address] authority and the [employer] and the [employer] General Act. Patient identification was verified, and a caregiver was present when appropriate. The patient was located at Other: place of employment . Provider was located at [facility] (Appt Dept): [address] [facility],  [facility]. Total time spent on this encounter:  30 minutes    --AZ Camarillo NP on 12/15/2022 at 7:46 AM    An electronic signature was used to authenticate this note.

## 2022-12-19 DIAGNOSIS — F34.1 DYSTHYMIA: ICD-10-CM

## 2022-12-19 RX ORDER — ARIPIPRAZOLE 2 MG/1
2 TABLET ORAL DAILY
Qty: 90 TABLET | Refills: 0 | Status: SHIPPED | OUTPATIENT
Start: 2022-12-19

## 2023-01-26 ENCOUNTER — TELEMEDICINE (OUTPATIENT)
Dept: FAMILY MEDICINE CLINIC | Age: 23
End: 2023-01-26
Payer: COMMERCIAL

## 2023-01-26 DIAGNOSIS — F41.1 GAD (GENERALIZED ANXIETY DISORDER): ICD-10-CM

## 2023-01-26 DIAGNOSIS — F34.1 DYSTHYMIA: Primary | ICD-10-CM

## 2023-01-26 PROBLEM — E53.9 VITAMIN B DEFICIENCY: Status: ACTIVE | Noted: 2023-01-26

## 2023-01-26 PROCEDURE — 99214 OFFICE O/P EST MOD 30 MIN: CPT | Performed by: NURSE PRACTITIONER

## 2023-01-26 PROCEDURE — G8427 DOCREV CUR MEDS BY ELIG CLIN: HCPCS | Performed by: NURSE PRACTITIONER

## 2023-01-26 RX ORDER — DESVENLAFAXINE 25 MG/1
25 TABLET, EXTENDED RELEASE ORAL DAILY
Qty: 30 TABLET | Refills: 3 | Status: SHIPPED | OUTPATIENT
Start: 2023-01-26 | End: 2023-02-25

## 2023-01-26 RX ORDER — FAMOTIDINE 20 MG/1
20 TABLET, FILM COATED ORAL 2 TIMES DAILY
COMMUNITY

## 2023-01-26 RX ORDER — LANOLIN ALCOHOL/MO/W.PET/CERES
1000 CREAM (GRAM) TOPICAL DAILY
COMMUNITY

## 2023-01-26 RX ORDER — DESVENLAFAXINE 100 MG/1
100 TABLET, EXTENDED RELEASE ORAL DAILY
Qty: 30 TABLET | Refills: 3 | Status: SHIPPED | OUTPATIENT
Start: 2023-01-26 | End: 2023-02-25

## 2023-01-26 ASSESSMENT — PATIENT HEALTH QUESTIONNAIRE - PHQ9
7. TROUBLE CONCENTRATING ON THINGS, SUCH AS READING THE NEWSPAPER OR WATCHING TELEVISION: 1
3. TROUBLE FALLING OR STAYING ASLEEP: 3
SUM OF ALL RESPONSES TO PHQ QUESTIONS 1-9: 13
8. MOVING OR SPEAKING SO SLOWLY THAT OTHER PEOPLE COULD HAVE NOTICED. OR THE OPPOSITE, BEING SO FIGETY OR RESTLESS THAT YOU HAVE BEEN MOVING AROUND A LOT MORE THAN USUAL: 1
2. FEELING DOWN, DEPRESSED OR HOPELESS: 1
SUM OF ALL RESPONSES TO PHQ QUESTIONS 1-9: 13
SUM OF ALL RESPONSES TO PHQ QUESTIONS 1-9: 13
6. FEELING BAD ABOUT YOURSELF - OR THAT YOU ARE A FAILURE OR HAVE LET YOURSELF OR YOUR FAMILY DOWN: 1
SUM OF ALL RESPONSES TO PHQ QUESTIONS 1-9: 13
SUM OF ALL RESPONSES TO PHQ9 QUESTIONS 1 & 2: 3
9. THOUGHTS THAT YOU WOULD BE BETTER OFF DEAD, OR OF HURTING YOURSELF: 0
5. POOR APPETITE OR OVEREATING: 1
4. FEELING TIRED OR HAVING LITTLE ENERGY: 3
1. LITTLE INTEREST OR PLEASURE IN DOING THINGS: 2

## 2023-01-26 ASSESSMENT — ANXIETY QUESTIONNAIRES
GAD7 TOTAL SCORE: 10
3. WORRYING TOO MUCH ABOUT DIFFERENT THINGS: 1
2. NOT BEING ABLE TO STOP OR CONTROL WORRYING: 1
5. BEING SO RESTLESS THAT IT IS HARD TO SIT STILL: 1
7. FEELING AFRAID AS IF SOMETHING AWFUL MIGHT HAPPEN: 1
1. FEELING NERVOUS, ANXIOUS, OR ON EDGE: 2
6. BECOMING EASILY ANNOYED OR IRRITABLE: 2
4. TROUBLE RELAXING: 2

## 2023-01-26 NOTE — PROGRESS NOTES
2023    TELEHEALTH EVALUATION -- Audio/Visual (During FSUVU-56 public health emergency)    HPI:    Isabelle Polk (:  2000) has requested an audio/video evaluation for the following concern(s): Anxiety/Depression  She is currently prescribed the following: Pristiq 125 mg daily for anxiety/depression, Abilify 2 mg daily, Deplin 7.5 mg daily for MTHFR mutation and hydroxyzine 25 mg at HS. She stopped taking Abilify because she noted that she felt more tired and had brain fog while taking the medication. She has started to work out and continues to see her therapist regularly. She continues to endorse the following depressive symptoms: feelings of being down, depressed or hopelessness, loss of interest in usual activities, flucutationg appetite, and feelings of guilt, worthlessness or loss of self confidence. The following anxiety symptoms are present: excessive worry and agitation. Anxiety has been worse the last few days and she is unsure of the trigger. She denies visual  and auditory hallucinations, delusions, illusions and paranoia. Pt denies current suicidal ideation, plan and intent. Pt  denies current homicidal ideation, plan and intent. Historically she has been prescribed the following medications: Wellbutrin - not effective (April-2020)   Buspar - dizziness  Lexapro - (2019 - present). She reports that it works well for her anxiety but has done nothing for her depression. Amitriptyline - for stomach pain. Medication was discontinue in  2019 because prescribing provider stated that increased dose wouldn't effectively treat her stomach pain. She notes that when taking Elavil her depression was well controlled and she did not have any difficulties with sleep.   Abilify - increased fatigue and brain fog  Melatonin 10 mg at     Review of Systems   Constitutional:  Negative for activity change, appetite change, chills, diaphoresis, fatigue, fever and unexpected weight change. Respiratory:  Negative for shortness of breath and wheezing. Cardiovascular:  Negative for chest pain and palpitations. Skin:  Negative for rash. Neurological:  Negative for dizziness, tremors, weakness and headaches. Psychiatric/Behavioral:  Positive for dysphoric mood and sleep disturbance. Negative for agitation, behavioral problems, confusion, decreased concentration, hallucinations, self-injury and suicidal ideas. The patient is nervous/anxious. The patient is not hyperactive. Symptoms are improving       Prior to Visit Medications    Medication Sig Taking?  Authorizing Provider   famotidine (PEPCID) 20 MG tablet Take 20 mg by mouth 2 times daily Yes Historical Provider, MD   vitamin B-12 (CYANOCOBALAMIN) 1000 MCG tablet Take 1,000 mcg by mouth daily Yes Historical Provider, MD   desvenlafaxine succinate (PRISTIQ) 25 MG TB24 extended release tablet Take 1 tablet by mouth daily Yes Travis Salas, APRN - NP   desvenlafaxine succinate (PRISTIQ) 100 MG TB24 extended release tablet Take 1 tablet by mouth daily Yes Lennart Medicine, APRN - NP   vitamin D (ERGOCALCIFEROL) 1.25 MG (22357 UT) CAPS capsule Take 50,000 Units by mouth once a week Yes Historical Provider, MD   PROFMYCHAL 391.3 (180 Fe) MG CAPS Take 1 capsule by mouth daily Yes Historical Provider, MD   hydrOXYzine HCl (ATARAX) 25 MG tablet Take 1 tablet by mouth at bedtime Yes Lennart Medicine, APRN - NP   methylfolate (DEPLIN) 7.5 MG TABS tablet Take 1 tablet by mouth daily Yes Lennart Medicine, APRN - NP   ondansetron (ZOFRAN-ODT) 4 MG disintegrating tablet Take 1 tablet by mouth every 12 hours as needed for Nausea Yes AZ Baptiste - NP   JONI 0.25-35 MG-MCG per tablet TAKE 1 TABLET BY MOUTH EVERY DAY Yes Johnny Rutledge APRN - NP   sucralfate (CARAFATE) 1 GM/10ML suspension Take 10 mLs by mouth 4 times daily Yes Johnny Rutledge APRN - ALEX       Social History     Tobacco Use    Smoking status: Never Smokeless tobacco: Never   Vaping Use    Vaping Use: Never used   Substance Use Topics    Alcohol use: Never    Drug use: Never        Allergies   Allergen Reactions    Pomegranate (Punica Granatum)    ,   Past Medical History:   Diagnosis Date    Anxiety     Heart murmur     As an infant   , No past surgical history on file.,   Social History     Tobacco Use    Smoking status: Never    Smokeless tobacco: Never   Vaping Use    Vaping Use: Never used   Substance Use Topics    Alcohol use: Never    Drug use: Never     MSE:  Appearance: alert, cooperative, no distress  Attention:Intact  Appetite: fluctuates depending on mood  Ambulation: unable to assess. Sleep disturbance: Yes  Loss of pleasure: Yes, improved. She notes increased motivation  Speech: normal rate, normal volume, and well articulated  Mood: Euthymic  Affect: normal affect  Thought Content: cognitive distortions and all or nothing thinking  Insight: Fair  Judgment: Intact  Memory: Intact long-term and Intact short-term  Suicide Assessment: no suicidal ideation  Homicide Assessment: denies current homicidal ideation, plan and intent     ASSESSMENT/PLAN:  1. Dysthymia  Stable. Continue medication. Patient to call if any concerns or SI before their follow up appointment. If she develops suicidal thoughts with a plan, she is instructed to go to emergency room immediately. Continue counseling as scheduled. - desvenlafaxine succinate (PRISTIQ) 25 MG TB24 extended release tablet; Take 1 tablet by mouth daily  Dispense: 30 tablet; Refill: 3  - desvenlafaxine succinate (PRISTIQ) 100 MG TB24 extended release tablet; Take 1 tablet by mouth daily  Dispense: 30 tablet; Refill: 3    2. ELOISA (generalized anxiety disorder)  Continue counseling as scheduled. - desvenlafaxine succinate (PRISTIQ) 25 MG TB24 extended release tablet; Take 1 tablet by mouth daily  Dispense: 30 tablet;  Refill: 3  - desvenlafaxine succinate (PRISTIQ) 100 MG TB24 extended release tablet; Take 1 tablet by mouth daily  Dispense: 30 tablet; Refill: 3    Return in about 4 weeks (around 2/23/2023) for Behavioral Health, Anxiety, Depression - VV. Shon Galvin, was evaluated through a synchronous (real-time) audio-video encounter. The patient (or guardian if applicable) is aware that this is a billable service, which includes applicable co-pays. This Virtual Visit was conducted with patient's (and/or legal guardian's) consent. The visit was conducted pursuant to the emergency declaration under the [address] [place] [address], [address] [place] [address] authority and the [employer] Act. Patient identification was verified, and a caregiver was present when appropriate. The patient was located at Home: [address] [place] [address]. Provider was located at [facility] (Appt Dept): [address]. Total time spent on this encounter:  30 minutes    --AZ Dye NP on 2/6/2023 at 11:02 AM    An electronic signature was used to authenticate this note.

## 2023-02-06 ASSESSMENT — ENCOUNTER SYMPTOMS
SHORTNESS OF BREATH: 0
WHEEZING: 0

## 2023-05-04 DIAGNOSIS — N94.3 PREMENSTRUAL SYNDROME: ICD-10-CM

## 2023-05-04 RX ORDER — NORGESTIMATE AND ETHINYL ESTRADIOL 0.25-0.035
1 KIT ORAL DAILY
Qty: 90 TABLET | Refills: 4 | Status: SHIPPED | OUTPATIENT
Start: 2023-05-04 | End: 2023-08-02

## 2023-06-01 DIAGNOSIS — F41.1 GAD (GENERALIZED ANXIETY DISORDER): ICD-10-CM

## 2023-06-01 DIAGNOSIS — F34.1 DYSTHYMIA: ICD-10-CM

## 2023-06-01 RX ORDER — DESVENLAFAXINE 100 MG/1
100 TABLET, EXTENDED RELEASE ORAL DAILY
Qty: 30 TABLET | Refills: 3 | Status: SHIPPED | OUTPATIENT
Start: 2023-06-01 | End: 2023-07-01

## 2023-06-01 RX ORDER — DESVENLAFAXINE 25 MG/1
25 TABLET, EXTENDED RELEASE ORAL DAILY
Qty: 30 TABLET | Refills: 3 | Status: SHIPPED | OUTPATIENT
Start: 2023-06-01 | End: 2023-07-01

## 2023-10-11 DIAGNOSIS — F34.1 DYSTHYMIA: ICD-10-CM

## 2023-10-11 DIAGNOSIS — F41.1 GAD (GENERALIZED ANXIETY DISORDER): ICD-10-CM

## 2023-10-13 RX ORDER — DESVENLAFAXINE 25 MG/1
25 TABLET, EXTENDED RELEASE ORAL DAILY
Qty: 30 TABLET | Refills: 3 | OUTPATIENT
Start: 2023-10-13

## 2023-10-15 DIAGNOSIS — F41.1 GAD (GENERALIZED ANXIETY DISORDER): ICD-10-CM

## 2023-10-15 DIAGNOSIS — F34.1 DYSTHYMIA: ICD-10-CM

## 2023-10-16 RX ORDER — DESVENLAFAXINE 25 MG/1
25 TABLET, EXTENDED RELEASE ORAL DAILY
Qty: 30 TABLET | Refills: 3 | OUTPATIENT
Start: 2023-10-16

## 2023-11-09 DIAGNOSIS — K21.9 GASTROESOPHAGEAL REFLUX DISEASE, UNSPECIFIED WHETHER ESOPHAGITIS PRESENT: ICD-10-CM

## 2023-11-10 RX ORDER — ONDANSETRON 4 MG/1
TABLET, ORALLY DISINTEGRATING ORAL
Qty: 60 TABLET | Refills: 3 | Status: SHIPPED | OUTPATIENT
Start: 2023-11-10

## 2024-01-27 DIAGNOSIS — F34.1 DYSTHYMIA: ICD-10-CM

## 2024-01-27 DIAGNOSIS — F41.1 GAD (GENERALIZED ANXIETY DISORDER): ICD-10-CM

## 2024-01-31 RX ORDER — DESVENLAFAXINE 100 MG/1
100 TABLET, EXTENDED RELEASE ORAL DAILY
Qty: 30 TABLET | Refills: 3 | OUTPATIENT
Start: 2024-01-31